# Patient Record
Sex: FEMALE | Race: ASIAN | NOT HISPANIC OR LATINO | URBAN - METROPOLITAN AREA
[De-identification: names, ages, dates, MRNs, and addresses within clinical notes are randomized per-mention and may not be internally consistent; named-entity substitution may affect disease eponyms.]

---

## 2017-05-05 PROBLEM — Z00.00 ENCOUNTER FOR PREVENTIVE HEALTH EXAMINATION: Status: ACTIVE | Noted: 2017-05-05

## 2017-05-06 ENCOUNTER — OUTPATIENT (OUTPATIENT)
Dept: OUTPATIENT SERVICES | Facility: HOSPITAL | Age: 27
LOS: 1 days | End: 2017-05-06
Payer: COMMERCIAL

## 2017-05-06 PROCEDURE — 70496 CT ANGIOGRAPHY HEAD: CPT | Mod: 26

## 2017-05-06 PROCEDURE — 70496 CT ANGIOGRAPHY HEAD: CPT

## 2017-05-09 ENCOUNTER — APPOINTMENT (OUTPATIENT)
Dept: NEUROSURGERY | Facility: CLINIC | Age: 27
End: 2017-05-09

## 2017-05-09 VITALS
HEIGHT: 61 IN | SYSTOLIC BLOOD PRESSURE: 108 MMHG | BODY MASS INDEX: 18.88 KG/M2 | HEART RATE: 69 BPM | DIASTOLIC BLOOD PRESSURE: 75 MMHG | WEIGHT: 100 LBS | OXYGEN SATURATION: 99 %

## 2017-05-09 DIAGNOSIS — Z78.9 OTHER SPECIFIED HEALTH STATUS: ICD-10-CM

## 2017-05-09 DIAGNOSIS — Z86.19 PERSONAL HISTORY OF OTHER INFECTIOUS AND PARASITIC DISEASES: ICD-10-CM

## 2017-05-09 DIAGNOSIS — Z82.49 FAMILY HISTORY OF ISCHEMIC HEART DISEASE AND OTHER DISEASES OF THE CIRCULATORY SYSTEM: ICD-10-CM

## 2017-05-09 DIAGNOSIS — Z86.69 PERSONAL HISTORY OF OTHER DISEASES OF THE NERVOUS SYSTEM AND SENSE ORGANS: ICD-10-CM

## 2017-05-09 DIAGNOSIS — Z87.09 PERSONAL HISTORY OF OTHER DISEASES OF THE RESPIRATORY SYSTEM: ICD-10-CM

## 2017-05-09 RX ORDER — TENOFOVIR DISOPROXIL FUMARATE 300 MG/1
300 TABLET, COATED ORAL
Refills: 0 | Status: ACTIVE | COMMUNITY

## 2020-10-21 ENCOUNTER — APPOINTMENT (OUTPATIENT)
Dept: MRI IMAGING | Facility: HOSPITAL | Age: 30
End: 2020-10-21

## 2020-10-21 ENCOUNTER — OUTPATIENT (OUTPATIENT)
Dept: OUTPATIENT SERVICES | Facility: HOSPITAL | Age: 30
LOS: 1 days | End: 2020-10-21
Payer: MEDICAID

## 2020-10-21 PROCEDURE — 70544 MR ANGIOGRAPHY HEAD W/O DYE: CPT | Mod: 26

## 2020-10-21 PROCEDURE — 70544 MR ANGIOGRAPHY HEAD W/O DYE: CPT

## 2020-10-27 ENCOUNTER — APPOINTMENT (OUTPATIENT)
Dept: NEUROSURGERY | Facility: CLINIC | Age: 30
End: 2020-10-27
Payer: MEDICAID

## 2020-11-03 ENCOUNTER — TRANSCRIPTION ENCOUNTER (OUTPATIENT)
Age: 30
End: 2020-11-03

## 2020-11-03 ENCOUNTER — APPOINTMENT (OUTPATIENT)
Dept: NEUROSURGERY | Facility: CLINIC | Age: 30
End: 2020-11-03
Payer: MEDICAID

## 2020-11-03 PROCEDURE — 99214 OFFICE O/P EST MOD 30 MIN: CPT | Mod: 95

## 2020-11-03 NOTE — DATA REVIEWED
[de-identified] : [facility]\par    [facility] Department of Radiology\par   Radiology Report\par \par \par Patient Name: NISHANT FREED   Report Date: 21-Oct-2020 18:43.00 \par Patient ID: [id number] ([id number]), [id number] ([id number])  Accession No.: [id number] \par Patient Birth Date: 1990  Report Status: F \par Referring Physician: [id number] SHOSHANA JEFFERSON   Reason For Study: infindibular dilataions at origins of right p-comm and right anterior choroidal  \par \par \par \par \par \par EXAM: MR ANGIO BRAIN\par \par PROCEDURE DATE: 10/21/2020\par \par \par \par INTERPRETATION: infindibular dilataions at origins of right p-comm and right anterior choroidal\par \par Technique: MRA of the intracranial circulation was performed using 3-D time of flight technique.\par \par Findings: Comparison to the prior MRI from an outside institution on 5/4/2017 cannot be performed at this time due to technical issues.\par \par There is an approximately 3 mm outpouching along the left supraclinoid internal carotid artery lateral to the takeoff of the left ophthalmic artery projecting superiorly consistent with a periophthalmic aneurysm . Evaluation of the anterior circulation demonstrates normal course and caliber of the right distal internal carotid artery. There is a normal appearance to the bilateral anterior cerebral and middle cerebral arteries.\par \par Evaluation the posterior circulation demonstrates normal course and caliber of the bilateral vertebral arteries, vertebrobasilar junction and basilar artery. The bilateral posterior cerebral arteries are also within normal limits.\par \par There is no evidence of stenosis .\par \par Impression: Left periophthalmic aneurysm\par \par \par \par \par Thank you for the opportunity to participate in the care of this patient.\par \par \par KLAUDIA ENGLAND M.D., ATTENDING RADIOLOGIST\par This document has been electronically signed. Oct 21 2020 6:43PM \par  Anesthesia confirms case reviewed for anesthesia risk alert.

## 2020-11-03 NOTE — PHYSICAL EXAM
[General Appearance - Alert] : alert [General Appearance - In No Acute Distress] : in no acute distress [Oriented To Time, Place, And Person] : oriented to person, place, and time [Impaired Insight] : insight and judgment were intact [Affect] : the affect was normal [Person] : oriented to person [Place] : oriented to place [Time] : oriented to time [Neck Appearance] : the appearance of the neck was normal [] : no respiratory distress [Respiration, Rhythm And Depth] : normal respiratory rhythm and effort

## 2020-11-03 NOTE — ASSESSMENT
[FreeTextEntry1] : Today we reviewed an MRA of the brain from October 21, 2020 that suggests enlargement of the aneurysm.  She isn’t a cigarette smoker and doesn’t have hypertension.\par \par We discussed what an aneurysm is and the risks of rupture.  The next step in the comprehensive neuro-imaging work up is to obtain a cerebral angiogram.  The procedure, benefits and risks were explained.  We will discuss the multiple management options after the angiogram.\par \par Plan:\par 1. Diagnostic cerebral angiogram on 11/16. Will need PCP clearance.

## 2020-11-03 NOTE — HISTORY OF PRESENT ILLNESS
[FreeTextEntry1] : \par 30 y/o RIGHT-HANDED female diagnosed with a small incidental intracranial aneurysm in 2017. MRA brain obtained as she has a FHX of rupture. She was complaining of lower extremity tingling sensation.\par \par MRA and CTA brain from May 2017 showed evidence of infindibular dilatations at the origins of the RIGHT posterior communicating artery and RIGHT anterior chroidal. There was also a small wide necked aneurysmal dilatation at the origin of the LEFT ophthalmic artery (2mm). \par \par Patient preferred to follow up with a repeat MRA but no follow up was made.\par \par Denies smoking and HTN hx.\par \par \par \par Handedness:\par RIGHT\par \par Address:\par [address]\[place] [address]\par \par Referring:\par Duane Coker MD\par [address] [facility] \[address]\[place] [address]\par T [phone]\par \par cc:\par Nancy Birmingham MD\par [address] [facility]\par [place], [place]\par T [phone]\par \par

## 2020-11-03 NOTE — ADDENDUM
[FreeTextEntry1] : November 3, 2020\par \par Duane Coker MD\par [address]\[place] [address]\par \par Patient’s Name:  Yuliet Reynaga\par : 1990\par \par Dear Dr. Coker:\par \par We saw Yuliet in tele health consultation at [facility].  As you know, she is a 29 years-old right handed woman who underwent neuro imaging work up as she has family history significant for subarachnoid hemorrhage from ruptured aneurysm. 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  Today we reviewed an MRA of the brain from 2020 that suggests enlargement of the aneurysm.  She isn’t a cigarette smoker and doesn’t have hypertension.\par \par We discussed what an aneurysm is and the risks of rupture.  The next step in the comprehensive neuro-imaging work up is to obtain a cerebral angiogram.  The procedure, benefits and risks were explained.  We will discuss the multiple management options after the angiogram.\par \par I will keep you updated at all times.  Thank you for allowing us to participate in Yuliet’s care.\par \par Sincerely,\par \par Jayden Palmer MD\par Chief\par Neuro-Endovascular Surgery and \par Interventional Neuroradiology \par [facility]\par [facility]\par [address]\par 3rd Floor\[place] [address]\par T:  [phone]\par F:  [phone]\par \par cc:	Nancy Birmingham MD\par 	[address]\par 	[place] \par \par 	Yuliet Reynaga\par 	[address]\par 	[address]\[place] 	[place] [address]\par \par \par

## 2020-11-04 DIAGNOSIS — Z01.818 ENCOUNTER FOR OTHER PREPROCEDURAL EXAMINATION: ICD-10-CM

## 2020-11-12 ENCOUNTER — NON-APPOINTMENT (OUTPATIENT)
Age: 30
End: 2020-11-12

## 2020-11-14 LAB — SARS-COV-2 N GENE NPH QL NAA+PROBE: NOT DETECTED

## 2020-11-15 RX ORDER — CHLORHEXIDINE GLUCONATE 213 G/1000ML
1 SOLUTION TOPICAL ONCE
Refills: 0 | Status: DISCONTINUED | OUTPATIENT
Start: 2020-11-16 | End: 2020-11-30

## 2020-11-16 ENCOUNTER — OUTPATIENT (OUTPATIENT)
Dept: OUTPATIENT SERVICES | Facility: HOSPITAL | Age: 30
LOS: 1 days | Discharge: HOME | End: 2020-11-16
Payer: MEDICAID

## 2020-11-16 LAB — HCG SERPL-ACNC: <0 MIU/ML — SIGNIFICANT CHANGE UP

## 2020-11-16 PROCEDURE — C1894: CPT

## 2020-11-16 PROCEDURE — C1725: CPT

## 2020-11-16 PROCEDURE — 36226 PLACE CATH VERTEBRAL ART: CPT | Mod: LT

## 2020-11-16 PROCEDURE — 84702 CHORIONIC GONADOTROPIN TEST: CPT

## 2020-11-16 PROCEDURE — 36227 PLACE CATH XTRNL CAROTID: CPT | Mod: 50,XS

## 2020-11-16 PROCEDURE — 36224 PLACE CATH CAROTD ART: CPT | Mod: 50

## 2020-11-16 PROCEDURE — 76377 3D RENDER W/INTRP POSTPROCES: CPT | Mod: 26

## 2020-11-16 PROCEDURE — 36415 COLL VENOUS BLD VENIPUNCTURE: CPT

## 2020-11-16 PROCEDURE — 36224 PLACE CATH CAROTD ART: CPT | Mod: 50,XS

## 2020-11-16 PROCEDURE — C1769: CPT

## 2020-11-16 PROCEDURE — 36227 PLACE CATH XTRNL CAROTID: CPT

## 2020-11-16 RX ORDER — SODIUM CHLORIDE 9 MG/ML
500 INJECTION INTRAMUSCULAR; INTRAVENOUS; SUBCUTANEOUS
Refills: 0 | Status: DISCONTINUED | OUTPATIENT
Start: 2020-11-16 | End: 2020-11-30

## 2020-11-16 RX ORDER — ACETAMINOPHEN 500 MG
650 TABLET ORAL ONCE
Refills: 0 | Status: DISCONTINUED | OUTPATIENT
Start: 2020-11-16 | End: 2020-11-30

## 2020-11-16 RX ADMIN — SODIUM CHLORIDE 100 MILLILITER(S): 9 INJECTION INTRAMUSCULAR; INTRAVENOUS; SUBCUTANEOUS at 10:56

## 2020-11-16 NOTE — PROGRESS NOTE ADULT - SUBJECTIVE AND OBJECTIVE BOX
Neuroendovascular Surgery Pre Proc Note      HPI: 29 years-old right handed woman with PMHx Hep B, who underwent neuro imaging work up as she has family history significant for subarachnoid hemorrhage from ruptured aneurysm. 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  MRA of the brain from October 21, 2020 review suggests enlargement of the aneurysm.  Patient now presents for diagnostic cerebral angiogram to further assess, she has no neurological defictis, denies recent illnesses, denies cough, sob, fever, myalgias, h/a's, or acute loss of taste or smell, covid test negative 11/13.        PMHx: as above, Chronic Hep B    PSHx: Neg    Soc Hx: Non smoker, no etoh, no illicit subst use    Fm Hx: SAH    Meds: Viread 300 mg oral daily    Allergies: NKDA, cats    Labs reviewed    Exam: NAD, WD/WN  HEENT: NC/AT, PERRLA, EOMI, anicteric, neck supple FROM, NT  Neuro: A&OX3, speech clear and fluent, CN 2-12 intact, motor 5/5 x4, SILT  CV: S1-S2, RRR  Lungs: CTA B/L  Abd: Soft, NT/ND, +BS  Ext: No C/C/E  LE Pulses: 2+ b/l DP  Skin: No rashes or lesions    Proc/R/B/A were explained all questions were answered and consent was signed.

## 2020-11-16 NOTE — BRIEF OPERATIVE NOTE - OPERATION/FINDINGS
Under MAC, using a 4 fr sheath right CFA, cerebral angiogram was performed.  Findings: Small left opthalmic aneurysm measuring approximately 2mm.  Mild right dural ring segment stenosis, non flow limiting  Full report to follow.  Patient tolerated procedure well, no new neurological deficit, hemodynamically stable.  Right groin/vasc access site: Direct manual compression applied, hemostasis achieved, no hematoma.  Patient was transferred to Cath lab recovery area and will go home later today.  Above d/w Spencer Arizmendi

## 2020-11-18 ENCOUNTER — NON-APPOINTMENT (OUTPATIENT)
Age: 30
End: 2020-11-18

## 2020-11-19 ENCOUNTER — NON-APPOINTMENT (OUTPATIENT)
Age: 30
End: 2020-11-19

## 2020-11-23 DIAGNOSIS — Z82.49 FAMILY HISTORY OF ISCHEMIC HEART DISEASE AND OTHER DISEASES OF THE CIRCULATORY SYSTEM: ICD-10-CM

## 2020-11-23 DIAGNOSIS — I67.1 CEREBRAL ANEURYSM, NONRUPTURED: ICD-10-CM

## 2020-11-23 DIAGNOSIS — I65.21 OCCLUSION AND STENOSIS OF RIGHT CAROTID ARTERY: ICD-10-CM

## 2020-11-23 DIAGNOSIS — Z83.3 FAMILY HISTORY OF DIABETES MELLITUS: ICD-10-CM

## 2020-11-23 DIAGNOSIS — Z82.3 FAMILY HISTORY OF STROKE: ICD-10-CM

## 2020-11-23 DIAGNOSIS — B18.1 CHRONIC VIRAL HEPATITIS B WITHOUT DELTA-AGENT: ICD-10-CM

## 2020-11-23 DIAGNOSIS — J45.909 UNSPECIFIED ASTHMA, UNCOMPLICATED: ICD-10-CM

## 2020-12-03 ENCOUNTER — NON-APPOINTMENT (OUTPATIENT)
Age: 30
End: 2020-12-03

## 2020-12-08 ENCOUNTER — APPOINTMENT (OUTPATIENT)
Dept: NEUROSURGERY | Facility: CLINIC | Age: 30
End: 2020-12-08
Payer: MEDICAID

## 2020-12-08 PROCEDURE — 99214 OFFICE O/P EST MOD 30 MIN: CPT | Mod: 95

## 2020-12-08 NOTE — REASON FOR VISIT
[Follow-Up: _____] : a [unfilled] follow-up visit [FreeTextEntry1] : Patient is s/p femoral cerebral angiogram on 11/16/2020 that demonstrated a 2 mm wide necked left para-ophthalmic aneurysm and 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\par \par The case was discussed on Cerebrovascular Conference with the recommendation to treat the aneurysm bc of patient's age and family history of SAH from ruptured aneurysm. Treatment options include Pipeline flow diverting stent or clipping via craniotomy. It was also recommended for the patient to undergo Rheumatology and Vascular Neurology w/u prior to treatment to rule out underlying vascular issue that may cause mild stenosis of the right ICA.\par \par She was referred to Dr. Josue Ramos (Rheum) and Dr. Nadya Sosa ([facility] Neurology) but she has a Neurologist from previous (Dr. Duane Coker) and was advised to follow up with him instead. She has a pending Rheumatology appointment on Thursday from outside [facility].\par \par She has no new neurological complains.

## 2020-12-08 NOTE — HISTORY OF PRESENT ILLNESS
[Home] : at home, [unfilled] , at the time of the visit. [Medical Office: ([facility])___] : at the medical office located in  [FreeTextEntry1] : \par 30 y/o RIGHT-HANDED female diagnosed with a small incidental intracranial aneurysm in 2017. MRA brain obtained as she has a FHX of rupture. She was complaining of lower extremity tingling sensation.\par \par MRA and CTA brain from May 2017 showed evidence of infindibular dilatations at the origins of the RIGHT posterior communicating artery and RIGHT anterior chroidal. There was also a small wide necked aneurysmal dilatation at the origin of the LEFT ophthalmic artery (2mm). Patient preferred to follow up with a repeat MRA but no follow up was made.\par \par A follow up MRA of the brain from October 21, 2020 suggested enlargement of the aneurysm. She underwent diagnostic cerebral angiogram on  11/16/2020 that demonstrated a 2 mm wide necked left para-ophthalmic aneurysm and 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\par \par Denies smoking and HTN hx.\par \par \par \par Handedness:\par RIGHT\par \par Address:\par [address]\[place] [address]\par \par Referring:\par Duane Coker MD\par [address] [facility] \[address]\[place] [address]\par T [phone]\par \par cc:\par Nancy Birmingham MD\par [address] [facility]\par [place], [place]\par T [phone]\par \par

## 2020-12-08 NOTE — ASSESSMENT
[FreeTextEntry1] : We obtained a cerebral angiogram on November 16, 2020 that demonstrated the aneurysm to measure 2 mm.  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  After discussion in cerebrovascular conference it was decided that because of her young age and family history it is appropriate to treat the left supra-ophthalmic aneurysm.  Today we discussed the options of endovascular flow diversion stenting and craniotomy with clip ligation.  She will follow up with Dr Shar Headley to discuss the clipping option more in detail.  It is also appropriate that she undergoes rheumatology work up because of the dysplastic appearance of bilateral ICAs.\par \par Plan:\par 1. See Rheumatology to rule out connective tissue disorder.\par 2. Follow up with Dr. Duane Coker for Neurology w/u.\par 3. Refer to Dr. Duane Hodges to discuss clipping of cerebral aneurysm--patient's insurance not accepted by Dr. Hodges. Gave the option of referring to Dr. Shar Headley. She would prefer Dr. Hodges and get out of network authorization. If auth is taking long, will refer to Dr. Headley.

## 2020-12-08 NOTE — ADDENDUM
[FreeTextEntry1] : 2020\par \par Duane Coker MD\par [address]\[place] [address]\par \par Patient’s Name:  Yuliet Reynaga\par : 1990\par \par Dear Dr. Coker:\par \par We saw Yuliet in tele health consultation at [facility].  As you know, she is a 29 years-old right handed woman who underwent neuro imaging work up as she has family history significant for subarachnoid hemorrhage from ruptured aneurysm. 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  We had reviewed an MRA of the brain from 2020 that suggested enlargement of the aneurysm.  She isn’t a cigarette smoker and doesn’t have hypertension.\par \par We obtained a cerebral angiogram on 2020 that demonstrated the aneurysm to measure 2 mm.  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  After discussion in cerebrovascular conference it was decided that because of her young age and family history it is appropriate to treat the left supra-ophthalmic aneurysm.  Today we discussed the options of endovascular flow diversion stenting and craniotomy with clip ligation.  She will follow up with Dr Shar Headley to discuss the clipping option more in detail.  It is also appropriate that she undergoes rheumatology work up because of the dysplastic appearance of bilateral ICAs.\par \par I will keep you updated at all times.  Thank you for allowing us to participate in Yuliet’s care.\par \par Sincerely,\par \par Jayden Palmer MD\par Chief\par Neuro-Endovascular Surgery and \par Interventional Neuroradiology \par [facility]\par [facility]\par [address]\par [address] Floor\[place] [address]\par T:  [phone]\par F:  [phone]\par \par cc:	Nancy Birmingham MD\par 	[address]\par 	[place], [place] \par \par 	Shar Headley MD\par \par 	Yuliet Man\par 	[address]\par 	[address]\[place] 	[place] [address]\par \par \par

## 2020-12-18 ENCOUNTER — APPOINTMENT (OUTPATIENT)
Dept: NEUROSURGERY | Facility: CLINIC | Age: 30
End: 2020-12-18
Payer: MEDICAID

## 2020-12-18 PROCEDURE — 99215 OFFICE O/P EST HI 40 MIN: CPT | Mod: 95

## 2020-12-18 NOTE — ASSESSMENT
[FreeTextEntry1] : Reviewed cerebral angiogram fro 11/16/2020 with patient which demonstrates 2 mm aneurysm of the left ICA as well as dysplasia and mild to moderate stenosis of the RIGHT ICA.\par She was presented at [facility] Cerebrovascular conference and given her family history of aneurysm rupture, it was recommended she consider treatment of aneurysm with flow diverting stent vs clipping.\par She is currently undergoing workup with rheumatology for underlying connective tissue disorder.\par Discussed craniotomy for clipping and risks, benefits and alternatives to surgery discussed in detail.\par Multiple questions answered to patient's satisfaction.\par \par Ms. Reynaga plans to follow up with Dr. Palmer after rheumatology workup complete and consultation with stroke neurology. She will return to see Dr. Headley if she would like to proceed with craniotomy for clip ligation of aneurysm.\par She has our contact information should she have any further questions/concerns.\par \par Patient verbalizes agreement and understanding with plan of care.\par \par

## 2020-12-18 NOTE — HISTORY OF PRESENT ILLNESS
[de-identified] : 30 year old woman with no pertinent medical history referred by Dr. Jayden Palmer  for discussion of craniotomy for clipping of cerebral aneurysm.\par \par Ms. Man was found to have an incidental intracranial aneurysm in . MRA brain was done due to family history of aneurysm rupture (her maternal grandmother  of ruptured cerebral aneurysm). She was complaining of lower extremity tingling sensation. 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 She preferred follow up with repeat MRA but no follow up was done. \par \par She had follow up MRA of the brain done on 10/21/2020 which suggested enlargement of the aneurysm. 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 \par \par The case was presented at [facility] Multidisciplinary Cerebrovascular Conference on with recommendations to treat the aneurysm based on age and and family history of SAH from ruptured aneurysm. Treatment options include flow diverting stent or clipping via craniotomy. It was recommended fort he patient to undergo rheumatology and vascular neurology workup prior to treatment to rule out underlying vascular disease that may cause mild stenosis of the RIGHT ICA. She has seen rheumatologist, Dr. Reji Luna, and he is suspicious that she has a fibromuscular dyplasia disorder. He is ordering a CT to evaluate for further evidence of fibromuscular dyplasia throughout the body. She has appointment with stroke neurology, Dr. Sosa next week.\par \par \par She has no history of smoking and no history of HTN.

## 2020-12-23 ENCOUNTER — APPOINTMENT (OUTPATIENT)
Dept: NEUROLOGY | Facility: CLINIC | Age: 30
End: 2020-12-23

## 2021-01-15 ENCOUNTER — APPOINTMENT (OUTPATIENT)
Dept: NEUROLOGY | Facility: CLINIC | Age: 31
End: 2021-01-15
Payer: MEDICAID

## 2021-01-15 VITALS
HEART RATE: 85 BPM | HEIGHT: 61 IN | TEMPERATURE: 97.7 F | WEIGHT: 107 LBS | DIASTOLIC BLOOD PRESSURE: 75 MMHG | SYSTOLIC BLOOD PRESSURE: 108 MMHG | OXYGEN SATURATION: 97 % | BODY MASS INDEX: 20.2 KG/M2

## 2021-01-15 PROCEDURE — ZZZZZ: CPT

## 2021-01-15 NOTE — HISTORY OF PRESENT ILLNESS
[FreeTextEntry1] : work up for FMD. \par hyperelastic joints in fingers, wrists, elbows and shoulders. \par no lens dislocation\par no fracture of joints. \par hip pain with to much walking\par \par Not very active. \par no gym \par just walks \par \par \par \par   MR Angio Head No Cont             Final\par \par No Documents Attached\par \par \par \par  EXAM:  MR ANGIO BRAIN\par \par PROCEDURE DATE:  10/21/2020\par \par \par \par INTERPRETATION:  infindibular dilataions at origins of right p-comm and right anterior choroidal\par \par Technique: MRA of the intracranial circulation was performed using 3-D time of flight technique.\par \par Findings: Comparison to the prior MRI from an outside institution on 5/4/2017 cannot be performed at this time due to technical issues.\par \par There is an approximately 3 mm outpouching along the left supraclinoid internal carotid artery lateral to the takeoff of the left ophthalmic artery projecting superiorly consistent with a periophthalmic aneurysm . Evaluation of the anterior circulation demonstrates normal course and caliber of the right distal internal carotid artery. There is a normal appearance to the bilateral anterior cerebral and middle cerebral arteries.\par \par Evaluation the posterior circulation demonstrates normal course and caliber of the bilateral vertebral arteries, vertebrobasilar junction and basilar artery. The bilateral posterior cerebral arteries are also within normal limits.\par \par There is no evidence of  stenosis  .\par \par Impression: Left periophthalmic aneurysm\par \par

## 2021-02-17 ENCOUNTER — NON-APPOINTMENT (OUTPATIENT)
Age: 31
End: 2021-02-17

## 2021-02-22 ENCOUNTER — TRANSCRIPTION ENCOUNTER (OUTPATIENT)
Age: 31
End: 2021-02-22

## 2021-04-26 ENCOUNTER — NON-APPOINTMENT (OUTPATIENT)
Age: 31
End: 2021-04-26

## 2021-07-15 ENCOUNTER — APPOINTMENT (OUTPATIENT)
Dept: NEUROLOGY | Facility: CLINIC | Age: 31
End: 2021-07-15
Payer: MEDICAID

## 2021-07-15 VITALS
HEIGHT: 61 IN | SYSTOLIC BLOOD PRESSURE: 106 MMHG | WEIGHT: 107 LBS | OXYGEN SATURATION: 96 % | TEMPERATURE: 98.4 F | DIASTOLIC BLOOD PRESSURE: 71 MMHG | HEART RATE: 82 BPM | BODY MASS INDEX: 20.2 KG/M2

## 2021-07-15 PROCEDURE — 99214 OFFICE O/P EST MOD 30 MIN: CPT

## 2021-07-25 NOTE — HISTORY OF PRESENT ILLNESS
[FreeTextEntry1] : Pt is 30yoF with PMH  Left periophthalmic aneurysm and R ICA stenosis here for routine f/u.\par In the process of w/u by neurosx to determine treatment plan: stent vs clipping\par \par /71 today \par  \par Had one episode blurred vision L eye between 3-5 months ago (unclear when) that lasted 45 minutes and resolved on its own\par \par Otherwise she denies any new or worsening specific neurological complaints including headache/ dizziness/ speech impairment/ focal or generalized weakness/ sensation changes/ gait disturbance/ tremor. \par \par MRA abdomen was not approved by insurance so unable to get that done

## 2021-07-25 NOTE — ASSESSMENT
[FreeTextEntry1] : Pt with PMH  Left periophthalmic aneurysm and R ICA stenosis, in process of being scheduled for stenting by neurosurgery, still need MRA abdomen to eval for FMD given her young age with hx of aneurysm and ICA stenosis \par \par plan:\par MRA abdomen\par \par R ICA stenosis\par -f/u with neurosx for stenting\par -cont ASA, monitor for ADEs \par \par Aneurysm\par -will check aspirin response given new episode of blurred vision \par -monitor for any further neurological symptoms\par \par

## 2021-08-18 ENCOUNTER — NON-APPOINTMENT (OUTPATIENT)
Age: 31
End: 2021-08-18

## 2021-08-23 ENCOUNTER — NON-APPOINTMENT (OUTPATIENT)
Age: 31
End: 2021-08-23

## 2021-09-02 ENCOUNTER — RESULT REVIEW (OUTPATIENT)
Age: 31
End: 2021-09-02

## 2021-09-02 ENCOUNTER — OUTPATIENT (OUTPATIENT)
Dept: OUTPATIENT SERVICES | Facility: HOSPITAL | Age: 31
LOS: 1 days | End: 2021-09-02

## 2021-09-02 ENCOUNTER — APPOINTMENT (OUTPATIENT)
Dept: MRI IMAGING | Facility: CLINIC | Age: 31
End: 2021-09-02
Payer: MEDICAID

## 2021-09-02 PROCEDURE — 74185 MRA ABD W OR W/O CNTRST: CPT | Mod: 26

## 2021-10-10 ENCOUNTER — NON-APPOINTMENT (OUTPATIENT)
Age: 31
End: 2021-10-10

## 2021-10-15 ENCOUNTER — APPOINTMENT (OUTPATIENT)
Dept: HEART AND VASCULAR | Facility: CLINIC | Age: 31
End: 2021-10-15
Payer: MEDICAID

## 2021-10-15 PROCEDURE — 99423 OL DIG E/M SVC 21+ MIN: CPT

## 2021-10-25 ENCOUNTER — TRANSCRIPTION ENCOUNTER (OUTPATIENT)
Age: 31
End: 2021-10-25

## 2021-11-09 NOTE — REASON FOR VISIT
[Home] : at home, [unfilled] , at the time of the visit. [Medical Office: ([facility])___] : at the medical office located in  [Verbal consent obtained from patient] : the patient, [unfilled] [FreeTextEntry3] : Nadya Alas [FreeTextEntry1] : Pt is 29 yo F hep B-treated, PMH Left periophthalmic aneurysm and prior R ICA stenosis awaiting stent vs clipping of aneurysm here for cardiovascular evaluation\par \par In 2017, she was getting headaches and the doctor heard that her grandfather had an aneurysm and he and she had an MRA and connected to Dr. Palmer. \par \par She reports that she has not had chest pain, dyspnea.No significant episodes of lightheadedness. \par No focal weakness other than a little brain fog. She reports on certain days she has what she deems to be brain fog which has been going on for years.  \par \par -She does not exercise, but walks for her usual activities. She has not been very active since high school. \par -She eats meat and a lot of veggies. She likes sugary beverages.\par -No prior pregnancies, relatively regular period. \par \par MGM- aneurysm, M- A&W- hep B, father- DM, no siblings\par \par She is currently working as a pharmacist. \par \par SH- no, no alcohol, no drugs \par PSH- none\par \par chol- 112, LDL 40, trig 41, HDL- 60

## 2021-11-09 NOTE — DISCUSSION/SUMMARY
[FreeTextEntry1] : Pt is 31 yo F hep B-treated, PMH Left periophthalmic aneurysm and R ICA stenosis awaiting stent vs clipping here for cardiovascular evaluation.\par \par Aneurysm//Stenosis- planned for intra-arterial stent placement for aneurysm. \par Based on very low LDL and no FH of CVD, more likely to be related to FMD. \par Can consider imaging of femorals for typical appearance if useful and use of CTA for more sensitive assessment, but can be done post procedure. \par \par Would benefit from an EKG prior to surgery/procedure, but she is Otherwise at very low risk from a cardiac standpoint and can proceed at this time.\par \par Will have her follow-up post procedure for further management. No evidence of need for any medical therapy at this time other than aspirin.

## 2021-11-23 ENCOUNTER — APPOINTMENT (OUTPATIENT)
Dept: NEUROSURGERY | Facility: CLINIC | Age: 31
End: 2021-11-23
Payer: MEDICAID

## 2021-11-23 PROCEDURE — 99213 OFFICE O/P EST LOW 20 MIN: CPT | Mod: 95

## 2021-11-23 NOTE — HISTORY OF PRESENT ILLNESS
[Home] : at home, [unfilled] , at the time of the visit. [Medical Office: ([facility])___] : at the medical office located in  [Verbal consent obtained from patient] : the patient, [unfilled] [FreeTextEntry1] : \par 29 y/o RIGHT-HANDED female diagnosed with a small incidental intracranial aneurysm in 2017. MRA brain obtained as she has a FHX of rupture. She was complaining of lower extremity tingling sensation. Denies smoking and HTN hx.\par \par MRA and CTA brain from May 2017 showed evidence of infindibular dilatations at the origins of the RIGHT posterior communicating artery and RIGHT anterior choroidal. There was also a small wide necked aneurysmal dilatation at the origin of the LEFT ophthalmic artery (2mm). Patient preferred to follow up with a repeat MRA but no follow up was made.\par \par A follow up MRA of the brain from October 21, 2020 suggested enlargement of the aneurysm. She underwent diagnostic cerebral angiogram on 11/16/2020 that demonstrated a 2 mm wide necked left para-ophthalmic aneurysm and 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 Of note, she was also found to have dysplasia and mild to moderate stenosis of the right ICA at the level of the dural ring.\par \par  After discussion in cerebrovascular conference it was decided that because of her young age and family history it is appropriate to treat the left supra-ophthalmic aneurysm. Treatment options including endovascular flow diversion stenting and craniotomy with clip ligation were discussed. She had a discussion with Dr Shar Headley re: clipping option.  It was also suggested that she undergoes rheumatology work up because of the dysplastic appearance of bilateral ICAs and stroke neurology consultation with Dr. Nadya Sosa to eval for FMD to make sure it is safe to undergo endovascular embolization treatment of the aneurysm with a stent. MRI results from Neuro w/u negative for FMD. She was referred to Cardio, Dr. Shona Kinney, for cardiac eval. \par \par She is currently on ASA 81 mg.\par \par \par \par \par \par Handedness:\par RIGHT\par \par Address:\par [address]\par [facility] [place] [address]\par \par Referring:\par Duane Coker MD\par [address] \[address]\[place] [address]\par T [phone]\par \par cc:\par Nancy Birmingham MD\par [address]\par [place], [place]\par T [phone]\par \par \par

## 2021-11-23 NOTE — ADDENDUM
[FreeTextEntry1] : Dear Dr. Coker:\par \par We saw Yuliet in tele health consultation at [facility].  As you know, she is a 30 years-old right-handed woman who underwent neuro imaging work up as she has family history significant for subarachnoid hemorrhage from ruptured aneurysm. 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  We had reviewed an MRA of the brain from October 21, 2020 that suggested enlargement of the aneurysm.  She isn’t a cigarette smoker and doesn’t have hypertension.\par \par We obtained a cerebral angiogram on November 16, 2020 that demonstrated the aneurysm to measure 2 mm.  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  After discussion in cerebrovascular conference, it was decided that because of her young age and family history it is appropriate to treat the left supra-ophthalmic aneurysm.  Today we discussed the options of endovascular flow diversion stenting and craniotomy with clip ligation.  She prefers to undergo clip ligation.\par \par I will keep you updated at all times.  Thank you for allowing us to participate in Yuliet’s care.\par \par \par Sincerely,\par \par \par Jayden Palmer MD\par Chief\par Neuro-Endovascular Surgery and \par Interventional Neuroradiology \par [facility]\par \par [facility]\par [address]\par [address] Floor\[place] [address]\par T:  [phone]\par F:  [phone]\par \par cc:	Nancy Birmingham MD\par 	[address]\par 	[place], [place] \par \par 	Shar Headley MD\par \par 	Yuliet Man\par 	[address]\par 	[address]\[place] 	[place] [address]\par \par \par \par \par \par

## 2021-11-23 NOTE — REASON FOR VISIT
[Follow-Up: _____] : a [unfilled] follow-up visit [FreeTextEntry1] : Patient doing well w/o new neuro complaints.

## 2021-11-23 NOTE — ASSESSMENT
[FreeTextEntry1] :  30 years-old right-handed woman who underwent neuro imaging work up as she has family history significant for subarachnoid hemorrhage from ruptured aneurysm. 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  We had reviewed an MRA of the brain from October 21, 2020 that suggested enlargement of the aneurysm.  She isn’t a cigarette smoker and doesn’t have hypertension.\par \par We obtained a cerebral angiogram on November 16, 2020 that demonstrated the aneurysm to measure 2 mm.  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  After discussion in cerebrovascular conference, it was decided that because of her young age and family history it is appropriate to treat the left supra-ophthalmic aneurysm.  Today we discussed the options of endovascular flow diversion stenting and craniotomy with clip ligation.  She prefers to undergo clip ligation.\par \par \par \par PLan:\par 1. Follow up with Dr. Headley for aneurysm clipping.

## 2021-12-03 ENCOUNTER — APPOINTMENT (OUTPATIENT)
Dept: NEUROSURGERY | Facility: CLINIC | Age: 31
End: 2021-12-03
Payer: MEDICAID

## 2021-12-03 PROCEDURE — 99215 OFFICE O/P EST HI 40 MIN: CPT | Mod: 95

## 2021-12-03 NOTE — HISTORY OF PRESENT ILLNESS
[de-identified] : 30 year old woman with no pertinent medical history referred by Dr. Jayden Palmer  for discussion of craniotomy for clipping of cerebral aneurysm.\par \par Ms. Reynaga was found to have an incidental intracranial aneurysm in . MRA brain was done due to family history of aneurysm rupture (her maternal grandmother  of ruptured cerebral aneurysm). She was complaining of lower extremity tingling sensation. 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 She preferred follow up with repeat MRA but no follow up was done. \par \par She had follow up MRA of the brain done on 10/21/2020 which suggested enlargement of the aneurysm. 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 \par \par The case was presented at [facility] Multidisciplinary Cerebrovascular Conference on with recommendations to treat the aneurysm based on age and and family history of SAH from ruptured aneurysm. Treatment options include flow diverting stent or clipping via craniotomy. It was recommended fort he patient to undergo rheumatology and vascular neurology workup prior to treatment to rule out underlying vascular disease that may cause mild stenosis of the RIGHT ICA. She has seen rheumatologist, Dr. Reji Luna, and he is suspicious that she has a fibromuscular dyplasia disorder. She is seeing Dr. Sosa for further evaluation of \par \par After discussion at cerebrovascular conference, it was decided that because of her young age and family history, it is appropriate to treat the left supra-ophthalmic aneurysm with flow diversion stenting vs clip ligation. \par \par She returns today to discuss craniotomy for clipping of aneurysm. \par \par She has no history of smoking and no history of HTN.

## 2021-12-03 NOTE — ASSESSMENT
[FreeTextEntry1] : Cerebral angiogram reviewed by Dr. Headley with patient, demonstrates a 2 mm wide necked left paraophthalmic aneurysm.\par Reviewed treatment options - flow diverting stent vs craniotomy for clipping of aneurysm vs continued imaging surveillance\par Discussed CVC recommendations for treatment of aneurysm.\par She has met with Dr. Palmer to discuss flow diverting stent and favors craniotomy for clipping of aneurysm.\par Discussed risks, benefits of alternatives to craniotomy in detail with patient.\par Multiple questions answered to patient's satisfaction.\par She verbalizes understanding and wishes to proceed with planned surgery - we will schedule her for 1/5/22.\par She will need medical clearance and COVID swab ~72 hours of surgery.\par \par Patient verbalizes agreement and understanding with plan of care.\par \par I, Dr. Headley, personally performed the evaluation and management (E/M) services for this established patient who presents today with (a) new problem(s)/exacerbation of (an) existing condition(s).  That E/M includes conducting the examination, assessing all new/exacerbated conditions, and establishing a new plan of care.  Today, my ACP, Sydni Quezada, was here to observe my evaluation and management services for this new problem/exacerbated condition to be followed going forward.\par \par

## 2021-12-06 ENCOUNTER — NON-APPOINTMENT (OUTPATIENT)
Age: 31
End: 2021-12-06

## 2021-12-19 ENCOUNTER — RESULT REVIEW (OUTPATIENT)
Age: 31
End: 2021-12-19

## 2021-12-19 ENCOUNTER — APPOINTMENT (OUTPATIENT)
Dept: MRI IMAGING | Facility: CLINIC | Age: 31
End: 2021-12-19
Payer: MEDICAID

## 2021-12-19 ENCOUNTER — OUTPATIENT (OUTPATIENT)
Dept: OUTPATIENT SERVICES | Facility: HOSPITAL | Age: 31
LOS: 1 days | End: 2021-12-19

## 2021-12-19 PROCEDURE — 70544 MR ANGIOGRAPHY HEAD W/O DYE: CPT | Mod: 26

## 2022-01-04 ENCOUNTER — TRANSCRIPTION ENCOUNTER (OUTPATIENT)
Age: 32
End: 2022-01-04

## 2022-01-04 VITALS
HEIGHT: 61 IN | OXYGEN SATURATION: 99 % | HEART RATE: 78 BPM | DIASTOLIC BLOOD PRESSURE: 64 MMHG | WEIGHT: 108.03 LBS | TEMPERATURE: 98 F | RESPIRATION RATE: 16 BRPM | SYSTOLIC BLOOD PRESSURE: 96 MMHG

## 2022-01-04 LAB — SARS-COV-2 N GENE NPH QL NAA+PROBE: NOT DETECTED

## 2022-01-04 RX ORDER — POVIDONE-IODINE 5 %
1 AEROSOL (ML) TOPICAL ONCE
Refills: 0 | Status: COMPLETED | OUTPATIENT
Start: 2022-01-05 | End: 2022-01-05

## 2022-01-04 RX ORDER — INFLUENZA VIRUS VACCINE 15; 15; 15; 15 UG/.5ML; UG/.5ML; UG/.5ML; UG/.5ML
0.5 SUSPENSION INTRAMUSCULAR ONCE
Refills: 0 | Status: DISCONTINUED | OUTPATIENT
Start: 2022-01-05 | End: 2022-01-07

## 2022-01-04 NOTE — PRE-OP CHECKLIST - SELECT TESTS ORDERED
COVID-19 BMP/CBC/CMP/PT/PTT/INR/EKG/CXR/COVID-19 Preg Test -DOS-/BMP/CBC/CMP/PT/PTT/INR/Type and Screen/EKG/CXR/COVID-19 Preg Test -DOS-NEGATIVE/BMP/CBC/CMP/PT/PTT/INR/Type and Screen/EKG/CXR/COVID-19

## 2022-01-04 NOTE — PATIENT PROFILE ADULT - FALL HARM RISK - UNIVERSAL INTERVENTIONS
Bed in lowest position, wheels locked, appropriate side rails in place/Call bell, personal items and telephone in reach/Instruct patient to call for assistance before getting out of bed or chair/Non-slip footwear when patient is out of bed/[place] to call system/Physically safe environment - no spills, clutter or unnecessary equipment/Purposeful Proactive Rounding/Room/bathroom lighting operational, light cord in reach

## 2022-01-05 ENCOUNTER — INPATIENT (INPATIENT)
Facility: HOSPITAL | Age: 32
LOS: 1 days | Discharge: HOME | DRG: 26 | End: 2022-01-07
Attending: NEUROLOGICAL SURGERY | Admitting: NEUROLOGICAL SURGERY
Payer: MEDICAID

## 2022-01-05 ENCOUNTER — APPOINTMENT (OUTPATIENT)
Dept: NEUROSURGERY | Facility: HOSPITAL | Age: 32
End: 2022-01-05

## 2022-01-05 DIAGNOSIS — Z98.890 OTHER SPECIFIED POSTPROCEDURAL STATES: Chronic | ICD-10-CM

## 2022-01-05 DIAGNOSIS — I67.1 CEREBRAL ANEURYSM, NONRUPTURED: ICD-10-CM

## 2022-01-05 LAB
BLD GP AB SCN SERPL QL: NEGATIVE — SIGNIFICANT CHANGE UP
RH IG SCN BLD-IMP: POSITIVE — SIGNIFICANT CHANGE UP

## 2022-01-05 PROCEDURE — 61703 CLAMP NECK ARTERY: CPT | Mod: 82

## 2022-01-05 PROCEDURE — 61703 CLAMP NECK ARTERY: CPT

## 2022-01-05 PROCEDURE — 61697 BRAIN ANEURYSM REPR COMPLX: CPT | Mod: 22

## 2022-01-05 PROCEDURE — 92240 ICG ANGIOGRAPHY I&R UNI/BI: CPT | Mod: 26,82

## 2022-01-05 PROCEDURE — 67570 DECOMPRESS OPTIC NERVE: CPT | Mod: 80,LT

## 2022-01-05 PROCEDURE — 92240 ICG ANGIOGRAPHY I&R UNI/BI: CPT | Mod: 26

## 2022-01-05 PROCEDURE — 61697 BRAIN ANEURYSM REPR COMPLX: CPT | Mod: 82,22

## 2022-01-05 PROCEDURE — 93888 INTRACRANIAL LIMITED STUDY: CPT | Mod: 26,82

## 2022-01-05 PROCEDURE — 93888 INTRACRANIAL LIMITED STUDY: CPT | Mod: 26

## 2022-01-05 PROCEDURE — 36224 PLACE CATH CAROTD ART: CPT | Mod: LT

## 2022-01-05 PROCEDURE — 62141 CRNOP SKULL DEFECT>5 CM DIAM: CPT

## 2022-01-05 PROCEDURE — 99024 POSTOP FOLLOW-UP VISIT: CPT

## 2022-01-05 PROCEDURE — 99291 CRITICAL CARE FIRST HOUR: CPT

## 2022-01-05 PROCEDURE — 67570 DECOMPRESS OPTIC NERVE: CPT | Mod: LT

## 2022-01-05 DEVICE — SURGICEL FIBRILLAR 4 X 4": Type: IMPLANTABLE DEVICE | Status: FUNCTIONAL

## 2022-01-05 DEVICE — PLATE UN3 2 HOLE: Type: IMPLANTABLE DEVICE | Status: FUNCTIONAL

## 2022-01-05 DEVICE — SURGIFOAM PAD 8CM X 12.5CM X 10MM (100): Type: IMPLANTABLE DEVICE | Status: FUNCTIONAL

## 2022-01-05 DEVICE — CLIP APPLIER ETHICON LIGACLIP 11.5" MEDIUM: Type: IMPLANTABLE DEVICE | Status: FUNCTIONAL

## 2022-01-05 DEVICE — CLIP APPLIER COVIDIEN SURGICLIP III 9" SM: Type: IMPLANTABLE DEVICE | Status: FUNCTIONAL

## 2022-01-05 DEVICE — COLLAGEN DURAMATRIX ONLAY 4X5IN: Type: IMPLANTABLE DEVICE | Status: FUNCTIONAL

## 2022-01-05 DEVICE — SURGIFLO HEMOSTATIC MATRIX KIT: Type: IMPLANTABLE DEVICE | Status: FUNCTIONAL

## 2022-01-05 DEVICE — PLATE COVER BURRHOLE UN3 W/TAB 14MM: Type: IMPLANTABLE DEVICE | Status: FUNCTIONAL

## 2022-01-05 DEVICE — IMPLANTABLE DEVICE: Type: IMPLANTABLE DEVICE | Status: FUNCTIONAL

## 2022-01-05 DEVICE — PLATE UN3 GAP 6 HOLE SM: Type: IMPLANTABLE DEVICE | Status: FUNCTIONAL

## 2022-01-05 DEVICE — SURGICEL 4 X 8": Type: IMPLANTABLE DEVICE | Status: FUNCTIONAL

## 2022-01-05 DEVICE — FILLER BONE VOID HYDROSET 15CC: Type: IMPLANTABLE DEVICE | Status: FUNCTIONAL

## 2022-01-05 DEVICE — GELFOAM 12 X 7MM: Type: IMPLANTABLE DEVICE | Status: FUNCTIONAL

## 2022-01-05 DEVICE — PIN SKULL STRL ADLT DORO LUCENT DISP PK/3: Type: IMPLANTABLE DEVICE | Status: FUNCTIONAL

## 2022-01-05 DEVICE — SCREW SLF-DRILL 1.5X4MM: Type: IMPLANTABLE DEVICE | Status: FUNCTIONAL

## 2022-01-05 DEVICE — PLATE COVER BURRHOLE UN3 W/TAB 10MM: Type: IMPLANTABLE DEVICE | Status: FUNCTIONAL

## 2022-01-05 RX ORDER — INSULIN LISPRO 100/ML
VIAL (ML) SUBCUTANEOUS
Refills: 0 | Status: DISCONTINUED | OUTPATIENT
Start: 2022-01-05 | End: 2022-01-06

## 2022-01-05 RX ORDER — DEXTROSE 50 % IN WATER 50 %
25 SYRINGE (ML) INTRAVENOUS ONCE
Refills: 0 | Status: DISCONTINUED | OUTPATIENT
Start: 2022-01-05 | End: 2022-01-06

## 2022-01-05 RX ORDER — GLUCAGON INJECTION, SOLUTION 0.5 MG/.1ML
1 INJECTION, SOLUTION SUBCUTANEOUS ONCE
Refills: 0 | Status: DISCONTINUED | OUTPATIENT
Start: 2022-01-05 | End: 2022-01-07

## 2022-01-05 RX ORDER — ONDANSETRON 8 MG/1
4 TABLET, FILM COATED ORAL EVERY 6 HOURS
Refills: 0 | Status: DISCONTINUED | OUTPATIENT
Start: 2022-01-05 | End: 2022-01-07

## 2022-01-05 RX ORDER — DEXAMETHASONE 0.5 MG/5ML
4 ELIXIR ORAL EVERY 6 HOURS
Refills: 0 | Status: COMPLETED | OUTPATIENT
Start: 2022-01-05 | End: 2022-01-06

## 2022-01-05 RX ORDER — CEFAZOLIN SODIUM 1 G
2000 VIAL (EA) INJECTION EVERY 8 HOURS
Refills: 0 | Status: COMPLETED | OUTPATIENT
Start: 2022-01-05 | End: 2022-01-06

## 2022-01-05 RX ORDER — DEXTROSE 50 % IN WATER 50 %
12.5 SYRINGE (ML) INTRAVENOUS ONCE
Refills: 0 | Status: DISCONTINUED | OUTPATIENT
Start: 2022-01-05 | End: 2022-01-06

## 2022-01-05 RX ORDER — SODIUM CHLORIDE 9 MG/ML
1000 INJECTION, SOLUTION INTRAVENOUS
Refills: 0 | Status: DISCONTINUED | OUTPATIENT
Start: 2022-01-05 | End: 2022-01-06

## 2022-01-05 RX ORDER — DEXAMETHASONE 0.5 MG/5ML
2 ELIXIR ORAL EVERY 6 HOURS
Refills: 0 | Status: COMPLETED | OUTPATIENT
Start: 2022-01-06 | End: 2022-01-07

## 2022-01-05 RX ORDER — DEXAMETHASONE 0.5 MG/5ML
ELIXIR ORAL
Refills: 0 | Status: DISCONTINUED | OUTPATIENT
Start: 2022-01-07 | End: 2022-01-07

## 2022-01-05 RX ORDER — DEXTROSE 50 % IN WATER 50 %
15 SYRINGE (ML) INTRAVENOUS ONCE
Refills: 0 | Status: DISCONTINUED | OUTPATIENT
Start: 2022-01-05 | End: 2022-01-06

## 2022-01-05 RX ORDER — CHLORHEXIDINE GLUCONATE 213 G/1000ML
1 SOLUTION TOPICAL
Refills: 0 | Status: DISCONTINUED | OUTPATIENT
Start: 2022-01-05 | End: 2022-01-07

## 2022-01-05 RX ORDER — SENNA PLUS 8.6 MG/1
2 TABLET ORAL AT BEDTIME
Refills: 0 | Status: DISCONTINUED | OUTPATIENT
Start: 2022-01-05 | End: 2022-01-07

## 2022-01-05 RX ORDER — ACETAMINOPHEN 500 MG
650 TABLET ORAL EVERY 6 HOURS
Refills: 0 | Status: DISCONTINUED | OUTPATIENT
Start: 2022-01-05 | End: 2022-01-07

## 2022-01-05 RX ORDER — PANTOPRAZOLE SODIUM 20 MG/1
40 TABLET, DELAYED RELEASE ORAL DAILY
Refills: 0 | Status: DISCONTINUED | OUTPATIENT
Start: 2022-01-05 | End: 2022-01-06

## 2022-01-05 RX ORDER — CHLORHEXIDINE GLUCONATE 213 G/1000ML
1 SOLUTION TOPICAL ONCE
Refills: 0 | Status: COMPLETED | OUTPATIENT
Start: 2022-01-05 | End: 2022-01-05

## 2022-01-05 RX ORDER — HYDROMORPHONE HYDROCHLORIDE 2 MG/ML
0.25 INJECTION INTRAMUSCULAR; INTRAVENOUS; SUBCUTANEOUS ONCE
Refills: 0 | Status: DISCONTINUED | OUTPATIENT
Start: 2022-01-05 | End: 2022-01-05

## 2022-01-05 RX ORDER — SODIUM CHLORIDE 9 MG/ML
1000 INJECTION INTRAMUSCULAR; INTRAVENOUS; SUBCUTANEOUS
Refills: 0 | Status: DISCONTINUED | OUTPATIENT
Start: 2022-01-05 | End: 2022-01-05

## 2022-01-05 RX ORDER — SODIUM CHLORIDE 9 MG/ML
1000 INJECTION INTRAMUSCULAR; INTRAVENOUS; SUBCUTANEOUS
Refills: 0 | Status: DISCONTINUED | OUTPATIENT
Start: 2022-01-05 | End: 2022-01-06

## 2022-01-05 RX ORDER — LEVETIRACETAM 250 MG/1
500 TABLET, FILM COATED ORAL EVERY 12 HOURS
Refills: 0 | Status: DISCONTINUED | OUTPATIENT
Start: 2022-01-05 | End: 2022-01-07

## 2022-01-05 RX ORDER — DEXAMETHASONE 0.5 MG/5ML
1 ELIXIR ORAL EVERY 6 HOURS
Refills: 0 | Status: DISCONTINUED | OUTPATIENT
Start: 2022-01-07 | End: 2022-01-07

## 2022-01-05 RX ADMIN — Medication 2000 MILLIGRAM(S): at 21:17

## 2022-01-05 RX ADMIN — HYDROMORPHONE HYDROCHLORIDE 0.25 MILLIGRAM(S): 2 INJECTION INTRAMUSCULAR; INTRAVENOUS; SUBCUTANEOUS at 16:42

## 2022-01-05 RX ADMIN — CHLORHEXIDINE GLUCONATE 1 APPLICATION(S): 213 SOLUTION TOPICAL at 06:39

## 2022-01-05 RX ADMIN — Medication 4 MILLIGRAM(S): at 18:20

## 2022-01-05 RX ADMIN — Medication 650 MILLIGRAM(S): at 22:14

## 2022-01-05 RX ADMIN — Medication 4 MILLIGRAM(S): at 23:15

## 2022-01-05 RX ADMIN — SENNA PLUS 2 TABLET(S): 8.6 TABLET ORAL at 21:16

## 2022-01-05 RX ADMIN — LEVETIRACETAM 500 MILLIGRAM(S): 250 TABLET, FILM COATED ORAL at 21:16

## 2022-01-05 RX ADMIN — Medication 1 APPLICATION(S): at 06:39

## 2022-01-05 RX ADMIN — HYDROMORPHONE HYDROCHLORIDE 0.25 MILLIGRAM(S): 2 INJECTION INTRAMUSCULAR; INTRAVENOUS; SUBCUTANEOUS at 17:00

## 2022-01-05 RX ADMIN — SODIUM CHLORIDE 50 MILLILITER(S): 9 INJECTION INTRAMUSCULAR; INTRAVENOUS; SUBCUTANEOUS at 16:42

## 2022-01-05 NOTE — H&P ADULT - NSHPPHYSICALEXAM_GEN_ALL_CORE
Oriented X 3  awake and alert  motor exam 5/5 in all extremities  no sensory deficit  CN intact  gait and cerebellar functions intact

## 2022-01-05 NOTE — PROGRESS NOTE ADULT - SUBJECTIVE AND OBJECTIVE BOX
=================================  NEUROCRITICAL CARE ATTENDING NOTE  =================================    LOURDES DILLARD   MRN-[id number]  Summary:  31y/F with hep B, with family history of aneurysm rupture (grandmother).  She presented with tingling lower extremity, MRA / CTA showed R PComm / R Achoroidal dilatations, Lopthhalmic artery dilatation.  Follow-up MRA c/w enlargement of aneurysm.  Angio 11/2020 showed wide-neck paraophtalmic aneurysm, mild stenosis R ICA dural ring.  Now admitted for elective crani/clipping.  (05 Jan 2022 07:56)    COURSE IN THE HOSPITAL:  01/05 admitted to [facility], s/p OR    Past Medical History: Hepatitis B  Allergies:  Bactrim (Rash) Cats (Unknown)  Home meds:     PHYSICAL EXAMINATION  T(C): -- HR: -- BP: -- RR: -- SpO2: --  NEUROLOGIC EXAMINATION:  Patient is    GENERAL: not intubated, not in cardiorespiratory distress  EENT:  anicteric  CARDIOVASCULAR: (+) S1 S2, normal rate and regular rhythm  PULMONARY: clear to auscultation bilaterally  ABDOMEN: soft, nontender with normoactive bowel sounds  EXTREMITIES: no edema  SKIN: no rash    LABS:  pending.    01-05 @ 07:01  -  01-05 @ 15:57  IN: 0 mL / OUT: 30 mL / NET: -30 mL    Bacteriology:  CSF studies:  EEG:  Neuroimaging:  Other imaging:    MEDICATIONS:  ·	ceFAZolin  Injectable. 2000 milliGRAM(s) IV Push every 8 hours  ·	acetaminophen     Tablet .. 650 milliGRAM(s) Oral every 6 hours PRN  ·	levETIRAcetam 500 milliGRAM(s) Oral every 12 hours  ·	ondansetron Injectable 4 milliGRAM(s) IV Push every 6 hours PRN    IV FLUIDS:  DRIPS:  DIET:  Lines:  Drains:      Wounds:    CODE STATUS:  Full Code                       GOALS OF CARE:  aggressive                      DISPOSITION:  ICU =================================  NEUROCRITICAL CARE ATTENDING NOTE  =================================    LOURDES DILLARD   MRN-[id number]  Summary:  31y/F with hep B, with family history of aneurysm rupture (grandmother).  She presented with tingling lower extremity, MRA / CTA showed R PComm / R Achoroidal dilatations, Lopthhalmic artery dilatation.  Follow-up MRA c/w enlargement of aneurysm.  Angio 11/2020 showed wide-neck paraophtalmic aneurysm, mild stenosis R ICA dural ring.  Now admitted for elective crani/clipping.  (05 Jan 2022 07:56)    COURSE IN THE HOSPITAL:  01/05 admitted to [facility], s/p OR    Past Medical History: Hepatitis B  Allergies:  Bactrim (Rash) Cats (Unknown)  Home meds:     PHYSICAL EXAMINATION  T(C): 37.8 (01-05 @ 15:15), Max: 37.8 (01-05 @ 15:15) HR: 80 (01-05 @ 16:45) (80 - 97) BP: 114/68 (01-05 @ 16:45) (114/68 - 130/65) RR: 12 (01-05 @ 16:45) (12 - 18) SpO2: 100% (01-05 @ 16:45) (100% - 100%)  NEUROLOGIC EXAMINATION:  Patient is sleepy but easily rousable, follows commands, opens eyes to voice, pupils equal and reactive, EOMs intact, moves all 4s with good strength  GENERAL: not intubated, not in cardiorespiratory distress  EENT:  anicteric  CARDIOVASCULAR: (+) S1 S2, normal rate and regular rhythm  PULMONARY: clear to auscultation bilaterally  ABDOMEN: soft, nontender with normoactive bowel sounds  EXTREMITIES: no edema, no groin hematoma, good distal pulses  SKIN: no rash    LABS: 01-05               12.0   10.53 )-----------( 184      ( 05 Jan 2022 16:18 )             35.1     143  |  111<H>  |  5<L>  ----------------------------<  158<H>  3.9   |  19<L>  |  0.49<L>    Ca    8.7      05 Jan 2022 16:19  Phos  2.9     01-05  Mg     2.0     01-05    TPro  6.4  /  Alb  3.9  /  TBili  0.3  /  DBili  x   /  AST  23  /  ALT  18  /  AlkPhos  51  01-05 01-05 @ 07:01  -  01-05 @ 17:07  IN: 0 mL / OUT: 55 mL / NET: -55 mL    Bacteriology:  CSF studies:  EEG:  Neuroimaging:  Other imaging:    MEDICATIONS:  ·	ceFAZolin  Injectable. 2000 milliGRAM(s) IV Push every 8 hours  ·	acetaminophen     Tablet .. 650 milliGRAM(s) Oral every 6 hours PRN  ·	levETIRAcetam 500 milliGRAM(s) Oral every 12 hours  ·	ondansetron Injectable 4 milliGRAM(s) IV Push every 6 hours PRN    IV FLUIDS: NS@50cc/hr  DRIPS:  DIET: NPO  Lines: Denia (pedal)  Drains:  [place]  Wounds:    CODE STATUS:  Full Code                       GOALS OF CARE:  aggressive                      DISPOSITION:  ICU

## 2022-01-05 NOTE — PROGRESS NOTE ADULT - ASSESSMENT
31y/F with  Hepatitis B        PLAN:   NEURO:  REHAB:  physical therapy evaluation and management    EARLY MOB:      PULM:  Room air, incentive spirometry  CARDIO:  SBP goal 100-150mm Hg  ENDO:  Blood sugar goals 140-180 mg/dL, continue insulin sliding scale  GI:  PPI for GI prophylaxis  DIET:  RENAL:    HEM/ONC:  VTE Prophylaxis:     ID: afebrile, no leukocytosis  ====================   T(F): --      ====================  Social: will update family    ATTENDING ATTESTATION:  I was physically present for the key portions of the evaluation and management (E/M) service provided.  I agree with the above history, physical and plan, which I have reviewed and edited where appropriate.    Patient at high risk for neurological deterioration or death due to:  ICU delirium, aspiration PNA, DVT / PE.  Critical care time:  I have personally provided 45 minutes of critical care time, excluding time spent on separate procedures.      Plan discussed with RN, house staff. 31y/F with  L supraophthalmic aneurysm, s/p clipping (01/05/2022, Dr. Headley, Dr. Townsend)  Asthma  Hepatitis B    PLAN:   NEURO: neurochecks q1h, PRN pain meds with acetaminophen  CT in AM  seizure prophylaxis:  levetiracetam 500mg PO BID, as per neurosurgery   steroid taper over 3 days as per neurosurgery  REHAB:  physical therapy evaluation and management    EARLY MOB: flat x 4 hours then HOB up    PULM:  PRN O2 support to keep sats >/=92%, PRN nebs for wheezing  CARDIO:  SBP goal 100-140mm Hg  ENDO:  Blood sugar goals 140-180 mg/dL, continue insulin sliding scale  GI:  PPI for GI prophylaxis while on steroids  DIET: NPO until more awake then advance as tolerated  RENAL:  IVF until eating well  HEM/ONC: Hb stable  VTE Prophylaxis: SCD, no DVT chemoprophylaxis for now as patient is high risk for bleed (fresh post-op)  ID: afebrile, no leukocytosis  Social: will update family    ATTENDING ATTESTATION:  I was physically present for the key portions of the evaluation and management (E/M) service provided.  I agree with the above history, physical and plan, which I have reviewed and edited where appropriate.    Patient at high risk for neurological deterioration or death due to:  ICU delirium, aspiration PNA, DVT / PE.  Critical care time:  I have personally provided 45 minutes of critical care time, excluding time spent on separate procedures.      Plan discussed with RN, house staff.

## 2022-01-05 NOTE — H&P ADULT - HISTORY OF PRESENT ILLNESS
This information was taken from the chart : "   31 year old woman with no pertinent medical history referred by Dr. Jayden Palmer for discussion of craniotomy for clipping of cerebral aneurysm.  Ms. Reynaga was found to have an incidental intracranial aneurysm in . MRA brain was done due to family history of aneurysm rupture (her maternal grandmother  of ruptured cerebral aneurysm). She was complaining of lower extremity tingling sensation.   MRA and CTA brain from May 2017 demonstrated infundibular dilatations at the origins of the RIGHT posterior communicating artery and RIGHT anterior choroidal. 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 She preferred follow up with repeat MRA but no follow up was done.   She had follow up MRA of the brain done on 10/21/2020 which suggested enlargement of the aneurysm. 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     The case was presented at [facility] Multidisciplinary Cerebrovascular Conference on with recommendations to treat the aneurysm based on age and and family history of SAH from ruptured aneurysm. Treatment options included flow diverting stent or clipping via craniotomy, after discussion at cerebrovascular conference, it was decided that because of her young age and family history, it is appropriate to treat the left supra-ophthalmic aneurysm with flow diversion stenting vs clip ligation.   She returns today for craniotomy for clipping of aneurysm , after a follow-up meeting in which she was informed about treatment modalities and chose to undergo clipping.    She has no history of smoking and no history of HTN.   "

## 2022-01-05 NOTE — BRIEF OPERATIVE NOTE - NSICDXBRIEFPROCEDURE_GEN_ALL_CORE_FT
PROCEDURES:  Angiogram, carotid and cerebral, bilateral 05-Jan-2022 13:24:36  Carol Renee  
PROCEDURES:  Brain aneurysm surgery 05-Jan-2022 15:38:10 clipping of Lt supra ophtalmic aneurysm unruptured Levi Chappell

## 2022-01-05 NOTE — BRIEF OPERATIVE NOTE - OPERATION/FINDINGS
subgleal SHARON left  intraop angio  neuromonitoring intact  intraop angio
Intraop angiogram   4 Polish short sheath was used in the right common femoral artery for access. A diagnostic angiogram was performed under general anesthesia care. Left ICA was injected and studied.     Findings:   There is no filling of the previously seen left ICA paraopthalmic aneurysm.     Full report to follow  Patient tolerated the procedure well and at baseline neurological condition.   Right groin vascular access site was closed with exoseal and applied manual compression.   There is no hematoma.     Above discussed with Dr. Salgado

## 2022-01-05 NOTE — BRIEF OPERATIVE NOTE - NSICDXBRIEFPREOP_GEN_ALL_CORE_FT
PRE-OP DIAGNOSIS:  Brain aneurysm 05-Jan-2022 13:24:51  Carol Renee  
PRE-OP DIAGNOSIS:  Brain aneurysm 05-Jan-2022 13:24:51  Carol Renee

## 2022-01-06 ENCOUNTER — TRANSCRIPTION ENCOUNTER (OUTPATIENT)
Age: 32
End: 2022-01-06

## 2022-01-06 LAB
A1C WITH ESTIMATED AVERAGE GLUCOSE RESULT: 4.7 % — SIGNIFICANT CHANGE UP (ref 4–5.6)
ANION GAP SERPL CALC-SCNC: 9 MMOL/L — SIGNIFICANT CHANGE UP (ref 5–17)
BUN SERPL-MCNC: 6 MG/DL — LOW (ref 7–23)
CALCIUM SERPL-MCNC: 8.4 MG/DL — SIGNIFICANT CHANGE UP (ref 8.4–10.5)
CHLORIDE SERPL-SCNC: 109 MMOL/L — HIGH (ref 96–108)
CO2 SERPL-SCNC: 23 MMOL/L — SIGNIFICANT CHANGE UP (ref 22–31)
CREAT SERPL-MCNC: 0.47 MG/DL — LOW (ref 0.5–1.3)
ESTIMATED AVERAGE GLUCOSE: 88 MG/DL — SIGNIFICANT CHANGE UP (ref 68–114)
GLUCOSE SERPL-MCNC: 139 MG/DL — HIGH (ref 70–99)
HCT VFR BLD CALC: 33.2 % — LOW (ref 34.5–45)
HGB BLD-MCNC: 11 G/DL — LOW (ref 11.5–15.5)
MAGNESIUM SERPL-MCNC: 2 MG/DL — SIGNIFICANT CHANGE UP (ref 1.6–2.6)
MCHC RBC-ENTMCNC: 32.2 PG — SIGNIFICANT CHANGE UP (ref 27–34)
MCHC RBC-ENTMCNC: 33.1 GM/DL — SIGNIFICANT CHANGE UP (ref 32–36)
MCV RBC AUTO: 97.1 FL — SIGNIFICANT CHANGE UP (ref 80–100)
NRBC # BLD: 0 /100 WBCS — SIGNIFICANT CHANGE UP (ref 0–0)
PHOSPHATE SERPL-MCNC: 4.5 MG/DL — SIGNIFICANT CHANGE UP (ref 2.5–4.5)
PLATELET # BLD AUTO: 154 K/UL — SIGNIFICANT CHANGE UP (ref 150–400)
POTASSIUM SERPL-MCNC: 3.9 MMOL/L — SIGNIFICANT CHANGE UP (ref 3.5–5.3)
POTASSIUM SERPL-SCNC: 3.9 MMOL/L — SIGNIFICANT CHANGE UP (ref 3.5–5.3)
RBC # BLD: 3.42 M/UL — LOW (ref 3.8–5.2)
RBC # FLD: 12.4 % — SIGNIFICANT CHANGE UP (ref 10.3–14.5)
SODIUM SERPL-SCNC: 141 MMOL/L — SIGNIFICANT CHANGE UP (ref 135–145)
WBC # BLD: 10.66 K/UL — HIGH (ref 3.8–10.5)
WBC # FLD AUTO: 10.66 K/UL — HIGH (ref 3.8–10.5)

## 2022-01-06 PROCEDURE — 99024 POSTOP FOLLOW-UP VISIT: CPT

## 2022-01-06 PROCEDURE — 99233 SBSQ HOSP IP/OBS HIGH 50: CPT

## 2022-01-06 RX ORDER — PANTOPRAZOLE SODIUM 20 MG/1
40 TABLET, DELAYED RELEASE ORAL
Refills: 0 | Status: DISCONTINUED | OUTPATIENT
Start: 2022-01-06 | End: 2022-01-07

## 2022-01-06 RX ADMIN — Medication 4 MILLIGRAM(S): at 05:18

## 2022-01-06 RX ADMIN — LEVETIRACETAM 500 MILLIGRAM(S): 250 TABLET, FILM COATED ORAL at 17:18

## 2022-01-06 RX ADMIN — PANTOPRAZOLE SODIUM 40 MILLIGRAM(S): 20 TABLET, DELAYED RELEASE ORAL at 12:36

## 2022-01-06 RX ADMIN — Medication 2 MILLIGRAM(S): at 17:17

## 2022-01-06 RX ADMIN — Medication 2 MILLIGRAM(S): at 23:58

## 2022-01-06 RX ADMIN — Medication 2000 MILLIGRAM(S): at 05:19

## 2022-01-06 RX ADMIN — Medication 4 MILLIGRAM(S): at 11:46

## 2022-01-06 RX ADMIN — LEVETIRACETAM 500 MILLIGRAM(S): 250 TABLET, FILM COATED ORAL at 05:19

## 2022-01-06 RX ADMIN — SENNA PLUS 2 TABLET(S): 8.6 TABLET ORAL at 22:25

## 2022-01-06 RX ADMIN — Medication 650 MILLIGRAM(S): at 11:42

## 2022-01-06 RX ADMIN — Medication 650 MILLIGRAM(S): at 11:27

## 2022-01-06 NOTE — PROGRESS NOTE ADULT - ASSESSMENT
31y/F with  L supraophthalmic aneurysm, s/p clipping (01/05/2022, Dr. Headley, Dr. Townsend)  Asthma  Hepatitis B    PLAN:   NEURO: neurochecks q1h, PRN pain meds with acetaminophen  CT in AM  seizure prophylaxis:  levetiracetam 500mg PO BID, as per neurosurgery   steroid taper over 3 days as per neurosurgery  REHAB:  physical therapy evaluation and management    EARLY MOB: flat x 4 hours then HOB up    PULM:  PRN O2 support to keep sats >/=92%, PRN nebs for wheezing  CARDIO:  SBP goal 100-140mm Hg  ENDO:  Blood sugar goals 140-180 mg/dL, continue insulin sliding scale  GI:  PPI for GI prophylaxis while on steroids  DIET: NPO until more awake then advance as tolerated  RENAL:  IVF until eating well  HEM/ONC: Hb stable  VTE Prophylaxis: SCD, no DVT chemoprophylaxis for now as patient is high risk for bleed (fresh post-op)  ID: afebrile, no leukocytosis  Social: will update family    ATTENDING ATTESTATION:  I was physically present for the key portions of the evaluation and management (E/M) service provided.  I agree with the above history, physical and plan, which I have reviewed and edited where appropriate.    Patient at high risk for neurological deterioration or death due to:  ICU delirium, aspiration PNA, DVT / PE.  Critical care time:  I have personally provided 45 minutes of critical care time, excluding time spent on separate procedures.      Plan discussed with RN, house staff. 31y/F with  L supraophthalmic aneurysm, s/p clipping (01/05/2022, Dr. Headley, Dr. Townsend)  Asthma  Hepatitis B    PLAN:   NEURO: neurochecks q4h, PRN pain meds with acetaminophen, opiates  seizure prophylaxis:  levetiracetam 500mg PO BID, as per neurosurgery   steroid taper over 3 days as per neurosurgery  REHAB:  physical therapy evaluation and management    EARLY MOB: OOB to chair, ambulate    PULM:  room air, PRN nebs for wheezing  CARDIO:  SBP goal 100-140mm Hg  ENDO:  Blood sugar goals 140-180 mg/dL, continue insulin sliding scale  GI:  PPI for GI prophylaxis while on steroids  DIET: encourage PO intake  RENAL:  IVF locked  HEM/ONC: Hb stable  VTE Prophylaxis: SCD, start SQL tonight if ok with NSG  ID: afebrile, no leukocytosis  Social: will update family    ATTENDING ATTESTATION:  I was physically present for the key portions of the evaluation and management (E/M) service provided.  I agree with the above history, physical and plan which I have reviewed and edited where appropriate.     Patient not at high risk for neurologic deterioration / death.  Time spent on this noncritically ill patient: 45 minutes spent on total encounter, more than 50% of the visit was spent counseling and/or coordinating care by the attending physician.    Plan discussed with RN, house staff.    REVIEW OF SYSTEMS:  No headaches, no nausea or vomiting; 14 -point review of systems otherwise unremarkable.        ICU stepdown Checklist:    Completed: 01-06 @ 10:08    [X] hemodynamically stable – VS WNL and stable x 24hours, UO adequate  [n/a ] if  previously on HDA - off pressors x 24h with stable neuro exam    [X] no new symptoms x 24h (i.e. new fever, new-onset nausea/vomiting)  [X] stable labs: (i.e. WBC not rising, sodium not dropping)  [X] patient not at high risk for aspiration, if high risk then:                  [ ] should have definitive plans for trach/PEG (alternative option is to discharge from ICU to facilty)                  [ ] stepdown to bed close to nurse’s station  [n/a] low suctioning requirements (i.e. q4h or less)  [X] sign-off from primary RN*  [X] drains do not require ICU level of care  [X] if patient previously agitated or with behavioral issues – controlled   [X] pain controlled

## 2022-01-06 NOTE — PROGRESS NOTE ADULT - SUBJECTIVE AND OBJECTIVE BOX
HPI:  This information was taken from the chart : "   31 year old woman with no pertinent medical history referred by Dr. Jayden Palmer for discussion of craniotomy for clipping of cerebral aneurysm.  Ms. Reynaga was found to have an incidental intracranial aneurysm in . MRA brain was done due to family history of aneurysm rupture (her maternal grandmother  of ruptured cerebral aneurysm). She was complaining of lower extremity tingling sensation.   MRA and CTA brain from May 2017 demonstrated infundibular dilatations at the origins of the RIGHT posterior communicating artery and RIGHT anterior choroidal. 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 She preferred follow up with repeat MRA but no follow up was done.   She had follow up MRA of the brain done on 10/21/2020 which suggested enlargement of the aneurysm. 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     The case was presented at [facility] Multidisciplinary Cerebrovascular Conference on with recommendations to treat the aneurysm based on age and and family history of SAH from ruptured aneurysm. Treatment options included flow diverting stent or clipping via craniotomy, after discussion at cerebrovascular conference, it was decided that because of her young age and family history, it is appropriate to treat the left supra-ophthalmic aneurysm with flow diversion stenting vs clip ligation.   She returns today for craniotomy for clipping of aneurysm , after a follow-up meeting in which she was informed about treatment modalities and chose to undergo clipping.    She has no history of smoking and no history of HTN.   "       (2022 07:56)    Hospital Course:  : POD0 L clipping crani of supra opthalmic aneurysm with intra-op angio : POD1 L crani clipping of supraophthalmic aneurysm w/intraop angio.        Vital Signs Last 24 Hrs  T(C): 37.6 (2022 02:01), Max: 38.1 (2022 20:00)  T(F): 99.7 (2022 02:01), Max: 100.6 (2022 20:00)  HR: 73 (2022 02:00) (72 - 97)  BP: 103/71 (2022 02:00) (103/71 - 130/65)  BP(mean): 83 (2022 02:00) (81 - 88)  RR: 15 (2022 02:00) (12 - 18)  SpO2: 100% (2022 02:00) (100% - 100%)    I&O's Summary    2022 07:01  -  2022 02:51  --------------------------------------------------------  IN: 840 mL / OUT: 825 mL / NET: 15 mL        PHYSICAL EXAM:  General: NAD, pt is comfortably sitting up in bed, on room air  HEENT: CN II-XII grossly intact, PERRL 3mm briskly reactive, EOMI b/l, face symmetric, tongue midline, neck FROM  Cardiovascular: RRR, normal S1 and S2   Respiratory: lungs CTAB, no wheezing, rhonchi, or crackles   GI: normoactive BS to auscultation, abd soft, NTND   Neuro: A&Ox3, No aphasia, speech clear, no dysmetria, no pronator drift. Follows commands.  VALDEZ x4 spontaneously, 5/5 strength in all extremities throughout. SILT throughout   Extremities: distal pulses 2+ x4  Wound/incision: headwrap in place, C/D/I. Anterior neck incision w/ dressing C/D/I. R groin dressing C/D/I without hematoma    TUBES/LINES:  [x] Mcgowan  [] Lumbar Drain  [x] Wound Drains: L subgaleal SHARON in place to suction  [] Others    DIET:  [x] NPO  [] Mechanical  [] Tube feeds    LABS:                        12.0   10.53 )-----------( 184      ( 2022 16:18 )             35.1     01-    143  |  111<H>  |  5<L>  ----------------------------<  158<H>  3.9   |  19<L>  |  0.49<L>    Ca    8.7      2022 16:19  Phos  2.9     -05  Mg     2.0     -    TPro  6.4  /  Alb  3.9  /  TBili  0.3  /  DBili  x   /  AST  23  /  ALT  18  /  AlkPhos  51  -05            CAPILLARY BLOOD GLUCOSE          Drug Levels: [] N/A    CSF Analysis: [] N/A      Allergies    Bactrim (Rash)  Cats (Unknown)    Intolerances      MEDICATIONS:  Antibiotics:  ceFAZolin  Injectable. 2000 milliGRAM(s) IV Push every 8 hours    Neuro:  acetaminophen     Tablet .. 650 milliGRAM(s) Oral every 6 hours PRN  levETIRAcetam 500 milliGRAM(s) Oral every 12 hours  ondansetron Injectable 4 milliGRAM(s) IV Push every 6 hours PRN    Anticoagulation:    OTHER:  chlorhexidine 2% Cloths 1 Application(s) Topical <User Schedule>  dexAMETHasone  Injectable   IV Push   dexAMETHasone  Injectable 4 milliGRAM(s) IV Push every 6 hours  dexAMETHasone  Injectable 2 milliGRAM(s) IV Push every 6 hours  dextrose 40% Gel 15 Gram(s) Oral once  dextrose 50% Injectable 25 Gram(s) IV Push once  dextrose 50% Injectable 12.5 Gram(s) IV Push once  dextrose 50% Injectable 25 Gram(s) IV Push once  glucagon  Injectable 1 milliGRAM(s) IntraMuscular once  influenza   Vaccine 0.5 milliLiter(s) IntraMuscular once  insulin lispro (ADMELOG) corrective regimen sliding scale   SubCutaneous three times a day before meals  pantoprazole  Injectable 40 milliGRAM(s) IV Push daily  senna 2 Tablet(s) Oral at bedtime    IVF:  dextrose 5%. 1000 milliLiter(s) IV Continuous <Continuous>  dextrose 5%. 1000 milliLiter(s) IV Continuous <Continuous>  sodium chloride 0.9%. 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 Patient now s/p L clipping crani of supra opthalmic aneurysm with intra-op angio 22    I67.1    Handoff    Hepatitis B    Brain aneurysm    Brain aneurysm    Brain aneurysm    Angiogram, carotid and cerebral, bilateral    Angiogram, carotid and cerebral, bilateral    Brain aneurysm surgery    History of coronary angiogram    [email or web address]      Plan:  NEURO:  -Neuro/vital/groin/vascular checks q1H  -Pain control PRN tylenol  -Decadron taper x 3 days to off  -Monitor SHARON output  -Keppra 500 BID for seizure prophylaxis    CARDIO:  -SBP goal < 140    PULM:  -Satting well on RA    GI:  -ADAT  -Bowel regimen PRN  -PPI while on decadron    RENAL:  -Na goal 135-145  -IVF until adequate PO intake    ENDO:  -ISS while on Decadron  -F/u A1C in AM    ID:  -Postop ancef  -Afebrile    HEME:  -SCDs for DVT ppx  -Trend H/H post op    DISPO: ICU status, full code, pending PT/OT  Case discussed with Dr. Headley and Dr Tapia, family updated with plan

## 2022-01-06 NOTE — PROGRESS NOTE ADULT - SUBJECTIVE AND OBJECTIVE BOX
=================================  NEUROCRITICAL CARE ATTENDING NOTE  =================================    LOURDES DILLARD   MRN-[id number]  Summary:  31y/F with hep B, with family history of aneurysm rupture (grandmother).  She presented with tingling lower extremity, MRA / CTA showed R PComm / R Achoroidal dilatations, L opthhalmic artery dilatation.  Follow-up MRA c/w enlargement of aneurysm.  Angio 11/2020 showed wide-neck paraophtalmic aneurysm, mild stenosis R ICA dural ring.  Now admitted for elective crani/clipping.  (05 Jan 2022 07:56)    COURSE IN THE HOSPITAL:  01/05 admitted to [facility], s/p OR  01/06 Tmax 38.1    Past Medical History: Hepatitis B  Allergies:  Bactrim (Rash) Cats (Unknown)  Home meds:     PHYSICAL EXAMINATION  T(C): 37.4 (01-06 @ 05:23), Max: 38.1 (01-05 @ 20:00) HR: 67 (01-06 @ 07:00) (67 - 97) BP: 97/66 (01-06 @ 07:00) (94/65 - 130/65) RR: 15 (01-06 @ 07:00) (12 - 18) SpO2: 100% (01-06 @ 07:00) (100% - 100%)  NEUROLOGIC EXAMINATION:  Patient is sleepy but easily rousable, follows commands, opens eyes to voice, pupils equal and reactive, EOMs intact, moves all 4s with good strength  GENERAL: not intubated, not in cardiorespiratory distress  EENT:  anicteric  CARDIOVASCULAR: (+) S1 S2, normal rate and regular rhythm  PULMONARY: clear to auscultation bilaterally  ABDOMEN: soft, nontender with normoactive bowel sounds  EXTREMITIES: no edema, no groin hematoma, good distal pulses  SKIN: no rash    LABS: 01-06             (10.53)  11.0 (12.0)  10.66 )-----------( 154      ( 06 Jan 2022 04:33 )             33.2     141  |  109<H>  |  6<L>  ----------------------------<  139<H>  3.9   |  23  |  0.47<L>    Ca    8.4      06 Jan 2022 04:33  Phos  4.5     01-06  Mg     2.0     01-06    TPro  6.4  /  Alb  3.9  /  TBili  0.3  /  DBili  x   /  AST  23  /  ALT  18  /  AlkPhos  51  01-05 01-05 @ 07:01 - 01-06 @ 07:00  IN: 1090 mL / OUT: 1790 mL / NET: -700 mL    Bacteriology:  CSF studies:  EEG:  Neuroimaging:  Other imaging:    MEDICATIONS: 01-06    ·	levETIRAcetam 500 Oral every 12 hours  ·	pantoprazole  Injectable 40 IV Push daily  ·	senna 2 Oral at bedtime  ·	dexAMETHasone  Injectable 4 IV Push every 6 hours  ·	dexAMETHasone  Injectable 2 IV Push every 6 hours  ·	insulin lispro (ADMELOG) corrective regimen sliding scale  SubCutaneous three times a day before meals  ·	acetaminophen     Tablet .. 650 Oral every 6 hours PRN  ·	ondansetron Injectable 4 IV Push every 6 hours PRN     IV FLUIDS: NS@50cc/hr  DRIPS:  DIET: NPO  Lines: Denia (pedal)  Drains:  Mcgowan  Wounds:    CODE STATUS:  Full Code                       GOALS OF CARE:  aggressive                      DISPOSITION:  ICU =================================  NEUROCRITICAL CARE ATTENDING NOTE  =================================    LOURDES DILLARD   MRN-[id number]  Summary:  31y/F with hep B, with family history of aneurysm rupture (grandmother).  She presented with tingling lower extremity, MRA / CTA showed R PComm / R Achoroidal dilatations, L opthhalmic artery dilatation.  Follow-up MRA c/w enlargement of aneurysm.  Angio 11/2020 showed wide-neck paraophtalmic aneurysm, mild stenosis R ICA dural ring.  Now admitted for elective crani/clipping.  (05 Jan 2022 07:56)    COURSE IN THE HOSPITAL:  01/05 admitted to [facility], s/p OR  01/06 Tmax 38.1    Past Medical History: Hepatitis B  Allergies:  Bactrim (Rash) Cats (Unknown)  Home meds:     PHYSICAL EXAMINATION  T(C): 37.4 (01-06 @ 05:23), Max: 38.1 (01-05 @ 20:00) HR: 67 (01-06 @ 07:00) (67 - 97) BP: 97/66 (01-06 @ 07:00) (94/65 - 130/65) RR: 15 (01-06 @ 07:00) (12 - 18) SpO2: 100% (01-06 @ 07:00) (100% - 100%)  NEUROLOGIC EXAMINATION:  Patient is awake, alert, fully oriented, follows commands, opens eyes to voice, pupils equal and reactive, EOMs intact, moves all 4s with good strength  GENERAL: not intubated, not in cardiorespiratory distress  EENT:  anicteric  CARDIOVASCULAR: (+) S1 S2, normal rate and regular rhythm  PULMONARY: clear to auscultation bilaterally  ABDOMEN: soft, nontender with normoactive bowel sounds  EXTREMITIES: no edema, no groin hematoma, good distal pulses  SKIN: no rash; wound clean dry, no discharge, no tracheal deviation, no swelling neck    LABS: 01-06             (10.53)  11.0 (12.0)  10.66 )-----------( 154      ( 06 Jan 2022 04:33 )             33.2     141  |  109<H>  |  6<L>  ----------------------------<  139<H>  3.9   |  23  |  0.47<L>    Ca    8.4      06 Jan 2022 04:33  Phos  4.5     01-06  Mg     2.0     01-06    TPro  6.4  /  Alb  3.9  /  TBili  0.3  /  DBili  x   /  AST  23  /  ALT  18  /  AlkPhos  51  01-05 01-05 @ 07:01  -  01-06 @ 07:00  IN: 1090 mL / OUT: 1790 mL / NET: -700 mL    Bacteriology:  CSF studies:  EEG:  Neuroimaging:  Other imaging:    MEDICATIONS: 01-06    ·	levETIRAcetam 500 Oral every 12 hours  ·	pantoprazole  Injectable 40 IV Push daily  ·	senna 2 Oral at bedtime  ·	dexAMETHasone  Injectable 4 IV Push every 6 hours  ·	dexAMETHasone  Injectable 2 IV Push every 6 hours  ·	insulin lispro (ADMELOG) corrective regimen sliding scale  SubCutaneous three times a day before meals  ·	acetaminophen     Tablet .. 650 Oral every 6 hours PRN  ·	ondansetron Injectable 4 IV Push every 6 hours PRN     IV FLUIDS: NS@50  DRIPS:  DIET: regular   Lines:   Drains:    Wounds:    CODE STATUS:  Full Code                       GOALS OF CARE:  aggressive                      DISPOSITION:  stepdown You can access the FollowMyHealth Patient Portal offered by [facility] by registering at the following website: [email or web address]. By joining [employer]’s FollowMyHealth portal, you will also be able to view your health information using other applications (apps) compatible with our system.

## 2022-01-07 ENCOUNTER — TRANSCRIPTION ENCOUNTER (OUTPATIENT)
Age: 32
End: 2022-01-07

## 2022-01-07 VITALS — TEMPERATURE: 99 F

## 2022-01-07 PROBLEM — B19.10 UNSPECIFIED VIRAL HEPATITIS B WITHOUT HEPATIC COMA: Chronic | Status: ACTIVE | Noted: 2022-01-04

## 2022-01-07 LAB
ANION GAP SERPL CALC-SCNC: 8 MMOL/L — SIGNIFICANT CHANGE UP (ref 5–17)
BUN SERPL-MCNC: 9 MG/DL — SIGNIFICANT CHANGE UP (ref 7–23)
CALCIUM SERPL-MCNC: 8.8 MG/DL — SIGNIFICANT CHANGE UP (ref 8.4–10.5)
CHLORIDE SERPL-SCNC: 109 MMOL/L — HIGH (ref 96–108)
CO2 SERPL-SCNC: 24 MMOL/L — SIGNIFICANT CHANGE UP (ref 22–31)
CREAT SERPL-MCNC: 0.45 MG/DL — LOW (ref 0.5–1.3)
GLUCOSE SERPL-MCNC: 114 MG/DL — HIGH (ref 70–99)
HCT VFR BLD CALC: 34.3 % — LOW (ref 34.5–45)
HGB BLD-MCNC: 11.7 G/DL — SIGNIFICANT CHANGE UP (ref 11.5–15.5)
MAGNESIUM SERPL-MCNC: 2.4 MG/DL — SIGNIFICANT CHANGE UP (ref 1.6–2.6)
MCHC RBC-ENTMCNC: 33.1 PG — SIGNIFICANT CHANGE UP (ref 27–34)
MCHC RBC-ENTMCNC: 34.1 GM/DL — SIGNIFICANT CHANGE UP (ref 32–36)
MCV RBC AUTO: 97.2 FL — SIGNIFICANT CHANGE UP (ref 80–100)
NRBC # BLD: 0 /100 WBCS — SIGNIFICANT CHANGE UP (ref 0–0)
PHOSPHATE SERPL-MCNC: 3.5 MG/DL — SIGNIFICANT CHANGE UP (ref 2.5–4.5)
PLATELET # BLD AUTO: 179 K/UL — SIGNIFICANT CHANGE UP (ref 150–400)
POTASSIUM SERPL-MCNC: 4 MMOL/L — SIGNIFICANT CHANGE UP (ref 3.5–5.3)
POTASSIUM SERPL-SCNC: 4 MMOL/L — SIGNIFICANT CHANGE UP (ref 3.5–5.3)
RBC # BLD: 3.53 M/UL — LOW (ref 3.8–5.2)
RBC # FLD: 13 % — SIGNIFICANT CHANGE UP (ref 10.3–14.5)
SODIUM SERPL-SCNC: 141 MMOL/L — SIGNIFICANT CHANGE UP (ref 135–145)
WBC # BLD: 10.73 K/UL — HIGH (ref 3.8–10.5)
WBC # FLD AUTO: 10.73 K/UL — HIGH (ref 3.8–10.5)

## 2022-01-07 PROCEDURE — 86850 RBC ANTIBODY SCREEN: CPT

## 2022-01-07 PROCEDURE — 85018 HEMOGLOBIN: CPT

## 2022-01-07 PROCEDURE — 84100 ASSAY OF PHOSPHORUS: CPT

## 2022-01-07 PROCEDURE — 80048 BASIC METABOLIC PNL TOTAL CA: CPT

## 2022-01-07 PROCEDURE — 76000 FLUOROSCOPY <1 HR PHYS/QHP: CPT

## 2022-01-07 PROCEDURE — 82947 ASSAY GLUCOSE BLOOD QUANT: CPT

## 2022-01-07 PROCEDURE — 82962 GLUCOSE BLOOD TEST: CPT

## 2022-01-07 PROCEDURE — 83735 ASSAY OF MAGNESIUM: CPT

## 2022-01-07 PROCEDURE — C1713: CPT

## 2022-01-07 PROCEDURE — 85025 COMPLETE CBC W/AUTO DIFF WBC: CPT

## 2022-01-07 PROCEDURE — 86923 COMPATIBILITY TEST ELECTRIC: CPT

## 2022-01-07 PROCEDURE — 86901 BLOOD TYPING SEROLOGIC RH(D): CPT

## 2022-01-07 PROCEDURE — 80053 COMPREHEN METABOLIC PANEL: CPT

## 2022-01-07 PROCEDURE — 86900 BLOOD TYPING SEROLOGIC ABO: CPT

## 2022-01-07 PROCEDURE — 82330 ASSAY OF CALCIUM: CPT

## 2022-01-07 PROCEDURE — C1889: CPT

## 2022-01-07 PROCEDURE — 84132 ASSAY OF SERUM POTASSIUM: CPT

## 2022-01-07 PROCEDURE — 97161 PT EVAL LOW COMPLEX 20 MIN: CPT

## 2022-01-07 PROCEDURE — 36415 COLL VENOUS BLD VENIPUNCTURE: CPT

## 2022-01-07 PROCEDURE — 84295 ASSAY OF SERUM SODIUM: CPT

## 2022-01-07 PROCEDURE — 85027 COMPLETE CBC AUTOMATED: CPT

## 2022-01-07 PROCEDURE — 83036 HEMOGLOBIN GLYCOSYLATED A1C: CPT

## 2022-01-07 PROCEDURE — 99024 POSTOP FOLLOW-UP VISIT: CPT

## 2022-01-07 RX ORDER — LEVETIRACETAM 250 MG/1
1 TABLET, FILM COATED ORAL
Qty: 60 | Refills: 0
Start: 2022-01-07 | End: 2022-02-05

## 2022-01-07 RX ORDER — POLYETHYLENE GLYCOL 3350 17 G/17G
17 POWDER, FOR SOLUTION ORAL
Qty: 170 | Refills: 0
Start: 2022-01-07 | End: 2022-01-16

## 2022-01-07 RX ORDER — ACETAMINOPHEN 500 MG
2 TABLET ORAL
Qty: 0 | Refills: 0 | DISCHARGE
Start: 2022-01-07

## 2022-01-07 RX ORDER — DEXAMETHASONE 0.5 MG/5ML
1 ELIXIR ORAL
Qty: 4 | Refills: 0
Start: 2022-01-07 | End: 2022-01-07

## 2022-01-07 RX ORDER — PANTOPRAZOLE SODIUM 20 MG/1
1 TABLET, DELAYED RELEASE ORAL
Qty: 1 | Refills: 0
Start: 2022-01-07 | End: 2022-01-07

## 2022-01-07 RX ADMIN — CHLORHEXIDINE GLUCONATE 1 APPLICATION(S): 213 SOLUTION TOPICAL at 06:23

## 2022-01-07 RX ADMIN — Medication 2 MILLIGRAM(S): at 06:17

## 2022-01-07 RX ADMIN — PANTOPRAZOLE SODIUM 40 MILLIGRAM(S): 20 TABLET, DELAYED RELEASE ORAL at 06:17

## 2022-01-07 RX ADMIN — Medication 2 MILLIGRAM(S): at 11:07

## 2022-01-07 RX ADMIN — LEVETIRACETAM 500 MILLIGRAM(S): 250 TABLET, FILM COATED ORAL at 06:17

## 2022-01-07 NOTE — DISCHARGE NOTE PROVIDER - NSDCMRMEDTOKEN_GEN_ALL_CORE_FT
acetaminophen 325 mg oral tablet: 2 tab(s) orally every 6 hours, As needed, Temp greater or equal to 38C (100.4F), Mild Pain (1 - 3)  dexamethasone 1 mg oral tablet: 1 tab(s) orally every 6 hours   levETIRAcetam 500 mg oral tablet: 1 tab(s) orally every 12 hours  MiraLax oral powder for reconstitution: 17 gram(s) orally once a day, As Needed -for constipation   pantoprazole 40 mg oral delayed release tablet: 1 tab(s) orally once a day (before a meal)

## 2022-01-07 NOTE — DISCHARGE NOTE PROVIDER - NSDCCPTREATMENT_GEN_ALL_CORE_FT
PRINCIPAL PROCEDURE  Procedure: Brain aneurysm surgery  Findings and Treatment: clipping of Lt supra ophtalmic aneurysm unruptured

## 2022-01-07 NOTE — DISCHARGE NOTE PROVIDER - NSDCFUADDINST_GEN_ALL_CORE_FT
Neurosurgery follow up appointment date/time:  - Your incision staples will be removed at your follow up appointment with Dr. Headley.  - please call the office to confirm appointment:     Wound Care:  - can patient shower?  - does dressing need to be changed/removed?    Activity:  - fatigue is common after surgery, rest if you feel tired   - no bending, lifting, twisting or heavy lifting   - walking is recommended, ambulate as tolerated  - you may shower when you get home, keep your incision dry  - no bathing   - no driving within 24 hours of anesthesia or while taking prescription pain medications   - keep hydrated, drink plenty of water   Please also follow up with your primary care doctor.     Pain Expectations:  - pain after surgery is expected  - please take pain meds as prescribed     Medications:  - changes to home meds (ex. AED's)?  - new meds?  - pain meds?  - when can antiplatelets or anticoagulants be restarted?  - were adverse affects of meds discussed with patients?   - pain medications can cause constipation, you should eat a high fiber diet and may take a stool softener while on pain meds   - Avoid taking Advil (ibuprofen), Motrin (naproxen), or Aspirin for pain as they can cause bleeding     Call the office or come to ED if:  - wound has drainage or bleeding, increased redness or pain at incision site, neurological change, fever (>101), chills, night sweats, syncope, nausea/vomiting      Playback:  - are discharge instruction on playback?  - is a picture of the incision on playback?     WITHIN 24 HOURS OF DISCHARGE, PLEASE CONTACT NEURO PA  WITH ANY QUESTIONS OR CONCERNS: [phone]   OTHERWISE, PLEASE CALL THE OFFICE WITH ANY QUESTIONS OR CONCERNS:  Neurosurgery follow up appointment date/time: 1/19/22 at 10AM  - Your incision staples will be removed at your follow up appointment with Dr. Headley.  - please call the office to confirm appointment: [phone]    Wound Care:  - you may shower and wash your hair with shampoo  - please leave incision open to air    Activity:  - fatigue is common after surgery, rest if you feel tired   - no bending, lifting, twisting or heavy lifting   - walking is recommended, ambulate as tolerated  - you may shower when you get home, keep your incision dry  - no bathing   - no driving within 24 hours of anesthesia or while taking prescription pain medications   - keep hydrated, drink plenty of water     Please also follow up with your primary care doctor.     Pain Expectations:  - pain after surgery is expected  - please take pain meds as prescribed     Medications:  - continue home meds as prescribed   - new meds: Keppra 500mg twice daily for one month for seizure prophylaxis. Dexamethasone taper for one more day for inflammation. Protonix while on Decadron for stomach ulcer prevention  - pain meds: Tylenol as needed    - pain medications can cause constipation, you should eat a high fiber diet and may take a stool softener while on pain meds   - Avoid taking Advil (ibuprofen), Motrin (naproxen), or Aspirin for pain as they can cause bleeding     Call the office or come to ED if:  - wound has drainage or bleeding, increased redness or pain at incision site, neurological change, fever (>101), chills, night sweats, syncope, nausea/vomiting      Playback:  - please see [employer] health for a copy of your discharge paperwork     WITHIN 24 HOURS OF DISCHARGE, PLEASE CONTACT NEURO PA  WITH ANY QUESTIONS OR CONCERNS: [phone]   OTHERWISE, PLEASE CALL THE OFFICE WITH ANY QUESTIONS OR CONCERNS: [phone]

## 2022-01-07 NOTE — PHYSICAL THERAPY INITIAL EVALUATION ADULT - GAIT DEVIATIONS NOTED, PT EVAL
Demo appropriate thomas, maintained fairly steady gait, no LOB and good navigation of hallway obstacles.

## 2022-01-07 NOTE — DISCHARGE NOTE PROVIDER - HOSPITAL COURSE
HPI:  31 year old woman with hx of asthma and hep b referred by Dr. Jayden Palmer for discussion of craniotomy for clipping of cerebral aneurysm.  Ms. Reynaga was found to have an incidental intracranial aneurysm in . MRA brain was done due to family history of aneurysm rupture (her maternal grandmother  of ruptured cerebral aneurysm). She was complaining of lower extremity tingling sensation.   MRA and CTA brain from May 2017 demonstrated infundibular dilatations at the origins of the RIGHT posterior communicating artery and RIGHT anterior choroidal. 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 She preferred follow up with repeat MRA but no follow up was done.   She had follow up MRA of the brain done on 10/21/2020 which suggested enlargement of the aneurysm. 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     Hospital Course:  : POD0 L clipping crani of supra opthalmic aneurysm with intra-op angio : POD1 L crani clipping of supraophthalmic aneurysm w/intraop angio.  thomas d/c. stepped down.   : POD2, PATRIA o/n, neuro stable, pending PT/OT    Patient evaluated by PT/OT who recommend: pending  Patient is going home? rehab? hospice? Facility Name:     Hospital course uncomplicated.    Checklist:   - Obtained follow up appointment from NP  - Reviewed final recommendations of inpatient consults  - Neurologically stable for discharge  - Vitals stable for discharge   - Afebrile for discharge  - WBC is stable  - Sodium level is normal  - Pain is adequately controlled  - Pt has PICC/walker/brace/collar        HPI:  31 year old woman with hx of asthma and hep b referred by Dr. Jayden Palmer for discussion of craniotomy for clipping of cerebral aneurysm.  Ms. Reynaga was found to have an incidental intracranial aneurysm in . MRA brain was done due to family history of aneurysm rupture (her maternal grandmother  of ruptured cerebral aneurysm). She was complaining of lower extremity tingling sensation.   MRA and CTA brain from May 2017 demonstrated infundibular dilatations at the origins of the RIGHT posterior communicating artery and RIGHT anterior choroidal. 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 She preferred follow up with repeat MRA but no follow up was done.   She had follow up MRA of the brain done on 10/21/2020 which suggested enlargement of the aneurysm. 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     Hospital Course:  : POD0 L clipping crani of supra opthalmic aneurysm with intra-op angio : POD1 L crani clipping of supraophthalmic aneurysm w/intraop angio.  thomas d/c. stepped down.   : POD2, PATRIA o/n, neuro stable, pending PT/OT    Patient evaluated by PT/OT who recommend: home with no needs    Hospital course uncomplicated.    Patient is neuro stable, vitals stable, afebrile, pain adequately controlled, medically ready for discharge

## 2022-01-07 NOTE — PHYSICAL THERAPY INITIAL EVALUATION ADULT - DISCHARGE DISPOSITION, PT EVAL
and assist from boyfriend at home, as needed. Discussed with DELIA De La Torre and TROY Dale (nsx)/home/no skilled PT needs

## 2022-01-07 NOTE — PHYSICAL THERAPY INITIAL EVALUATION ADULT - GENERAL OBSERVATIONS, REHAB EVAL
PT IE completed. Patient received semi supine in bed +tele, +cranial incision C/D/I, +(B) wrist heplock IV's, NAD, willing to work with PT

## 2022-01-07 NOTE — DISCHARGE NOTE PROVIDER - NSDCADMDATE_GEN_ALL_CORE_FT
Protocol For Photochemotherapy: Triamcinolone Ointment And Nbuvb: The patient received Photochemotherapy: Triamcinolone and NBUVB (triamcinolone ointment applied to all lesions prior to phototherapy). Protocol For Photochemotherapy For Severe Photoresponsive Dermatoses: Petrolatum And Nbuvb: The patient received Photochemotherapy for severe photoresponsive dermatoses: Petrolatum and NBUVB requiring at least 4 to 8 hours of care under direct physician supervision. Additional Shield Locations: Glasses Total Body Time: 1:58 Protocol For Protocol For Photochemotherapy For Severe Photoresponsive Dermatoses: Bath Puva: The patient received Photochemotherapy for severe photoresponsive dermatoses: Bath PUVA requiring at least 4 to 8 hours of care under direct physician supervision. Protocol For Photochemotherapy: Mineral Oil And Broad Band Uvb: The patient received Photochemotherapy: Mineral Oil and Broad Band UVB. Protocol For Photochemotherapy For Severe Photoresponsive Dermatoses: Tar And Nbuvb (Goeckerman Treatment): The patient received Photochemotherapy for severe photoresponsive dermatoses: Tar and NBUVB (Goeckerman treatment) requiring at least 4 to 8 hours of care under direct physician supervision. Protocol For Nb Uva: The patient received NB UVA. 05-Jan-2022 05:29 Protocol For Photochemotherapy: Tar And Broad Band Uvb (Goeckerman Treatment): The patient received Photochemotherapy: Tar and Broad Band UVB (Goeckerman treatment). Protocol For Photochemotherapy: Petrolatum And Nbuvb: The patient received Photochemotherapy: Petrolatum and NBUVB (petrolatum applied to all lesions prior to phototherapy). Protocol For Puva: The patient received PUVA. Protocol For Photochemotherapy: Baby Oil And Nbuvb: The patient received Photochemotherapy: Baby Oil and NBUVB (baby oil applied to all lesions prior to phototherapy). Protocol For Broad Band Uvb: The patient received Broad Band UVB. Protocol For Nbuvb: The patient received NBUVB. Protocol For Photochemotherapy: Petrolatum And Broad Band Uvb: The patient received Photochemotherapy: Petrolatum and Broad Band UVB. Post-Care Instructions: I reviewed with the patient in detail post-care instructions. Patient is to wear sun protection. Patients may expect sunburn like redness, discomfort and scabbing. Detail Level: Zone Skin Type: I Consent: Written consent obtained. The risks were reviewed with the patient including but not limited to: burn, pigmentary changes, pain, blistering, scabbing, redness, increased risk of skin cancers, and the remote possibility of scarring. Protocol For Photochemotherapy For Severe Photoresponsive Dermatoses: Tar And Broad Band Uvb (Goeckerman Treatment): The patient received Photochemotherapy for severe photoresponsive dermatoses: Tar and Broad Band UVB (Goeckerman treatment) requiring at least 4 to 8 hours of care under direct physician supervision. Protocol For Photochemotherapy For Severe Photoresponsive Dermatoses: Puva: The patient received Photochemotherapy for severe photoresponsive dermatoses: PUVA requiring at least 4 to 8 hours of care under direct physician supervision. Render Post-Care In The Note: no Protocol For Photochemotherapy For Severe Photoresponsive Dermatoses: Petrolatum And Broad Band Uvb: The patient received Photochemotherapyfor severe photoresponsive dermatoses: Petrolatum and Broad Band UVB requiring at least 4 to 8 hours of care under direct physician supervision. Protocol For Uva1: The patient received UVA1. Comments On Previous Treatment: Patient takes a stool in steve and requires help putting on oil. Total Body Energy: 477 mj Protocol For Uva: The patient received UVA. Protocol: Photochemotherapy: Mineral Oil and NBUVB Protocol For Bath Puva: The patient received Bath PUVA. Total Treatment Time: 20 Minutes Name Of Supervising Technician: [place] [employer]rradiance (Optional- Include Units): 4.05 Protocol For Photochemotherapy: Mineral Oil And Nbuvb: The patient received Photochemotherapy: Mineral Oil and NBUVB (mineral oil applied to all lesions prior to phototherapy). Protocol For Photochemotherapy: Tar And Nbuvb (Goeckerman Treatment): The patient received Photochemotherapy: Tar and NBUVB (Goeckerman treatment). Treatment Number: [facility]

## 2022-01-07 NOTE — OCCUPATIONAL THERAPY INITIAL EVALUATION ADULT - MODALITIES TREATMENT COMMENTS
Cranial Nerves II - XII: II: PERRLA; visual fields are full to confrontation III, IV, VI: EOMI, L eye slight lateral nystagmus noted during tracking  V: facial sensation intact to light touch V1-V3 b/l VII: no ptosis, no facial droop, limited L eye brow raise 2/2 head pain VIII: hearing intact to finger rub b/l  XI: head turning and shoulder shrug intact b/l XII: tongue protrusion midline.  Vision Quadrant Test: WFL

## 2022-01-07 NOTE — OCCUPATIONAL THERAPY INITIAL EVALUATION ADULT - ADDITIONAL COMMENTS
Pt lives with her boyfriend in a 4th floor walkup apartment. Prior to admit, pt independent with all ADLs/IADLs and ambulation, did not require any DME or AD

## 2022-01-07 NOTE — PROGRESS NOTE ADULT - REASON FOR ADMISSION
Clipping of letf para ophtalmic aneurysm
Clipping of left para ophtalmic aneurysm
Clipping of letf para ophtalmic aneurysm

## 2022-01-07 NOTE — DISCHARGE NOTE NURSING/CASE MANAGEMENT/SOCIAL WORK - PATIENT PORTAL LINK FT
You can access the FollowMyHealth Patient Portal offered by [facility] by registering at the following website: [email or web address]. By joining [facility]’s FollowMyHealth portal, you will also be able to view your health information using other applications (apps) compatible with our system.

## 2022-01-07 NOTE — OCCUPATIONAL THERAPY INITIAL EVALUATION ADULT - GENERAL OBSERVATIONS, REHAB EVAL
Pt received semi-supine in bed, +tele, +cranial staples C/D/I, +heplock, in NAD and agreeable to OT. Cleared by ALEXIS Lanier.

## 2022-01-07 NOTE — PROGRESS NOTE ADULT - THIS PATIENT HAS THE FOLLOWING CONDITION(S)/DIAGNOSES ON THIS ADMISSION:
None
Cerebral Edema/Brain Compression / Herniation
Cerebral Edema/Brain Compression / Herniation
None
None

## 2022-01-07 NOTE — DISCHARGE NOTE PROVIDER - NSDCFUADDAPPT_GEN_ALL_CORE_FT
Please follow up with Dr. Headley on 1/19/22 at 10AM    Please follow up with your primary care doctor

## 2022-01-07 NOTE — PHYSICAL THERAPY INITIAL EVALUATION ADULT - ADDITIONAL COMMENTS
Patient is a community ambulator who lives with her boyfriend in a 4th floor walk up. Patient is independent with all ADLs prior and denies use of any assistive devices during ambulation.

## 2022-01-07 NOTE — PHYSICAL THERAPY INITIAL EVALUATION ADULT - PERTINENT HX OF CURRENT PROBLEM, REHAB EVAL
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 Patient now s/p L clipping crani of supra opthalmic aneurysm with intra-op angio 1/5/22

## 2022-01-07 NOTE — OCCUPATIONAL THERAPY INITIAL EVALUATION ADULT - MODIFIED CLINICAL TEST OF SENSORY INTEGRATION IN BALANCE TEST
Pt able to ambulate ~100' independently without AD, pt able to negotiate 1 flight of stairs up/down without use of hand rail with supervision. No LOB noted throughout, with good safety awareness.

## 2022-01-07 NOTE — OCCUPATIONAL THERAPY INITIAL EVALUATION ADULT - DIAGNOSIS, OT EVAL
Pt is s/p L clipping crani of supra opthalmic aneurysm (1/5) presents at functional baseline, no new neurological deficits noted, BUE/BLE strength and ROM WFL, with pt able to complete all ADL and mobility tasks independently. No further acute OT needs. Pt will be d/c from OT at this time.

## 2022-01-07 NOTE — DISCHARGE NOTE NURSING/CASE MANAGEMENT/SOCIAL WORK - NSDCPEFALRISK_GEN_ALL_CORE
For information on Fall & Injury Prevention, visit: [email or web address] OR  [email or web address] OR  https://www.cdc.gov/steadi/patient.html

## 2022-01-07 NOTE — DISCHARGE NOTE PROVIDER - NSDCCPCAREPLAN_GEN_ALL_CORE_FT
PRINCIPAL DISCHARGE DIAGNOSIS  Diagnosis: Brain aneurysm  Assessment and Plan of Treatment: Left paraopthalmic aneurysm      SECONDARY DISCHARGE DIAGNOSES  Diagnosis: Asthma  Assessment and Plan of Treatment:     Diagnosis: Hepatitis B  Assessment and Plan of Treatment:

## 2022-01-07 NOTE — DISCHARGE NOTE PROVIDER - CARE PROVIDER_API CALL
Shar Headley)  Neurosurgery  [address], [place] [facility]  Phone: [phone]  Fax: [phone]  Follow Up Time:

## 2022-01-07 NOTE — PHYSICAL THERAPY INITIAL EVALUATION ADULT - TRANSFER SAFETY CONCERNS NOTED: SIT/STAND, REHAB EVAL
Demo good eccentric control during descend. Performed sit<>stand transfer x 3 trials as patient with void attempt during PT session.

## 2022-01-07 NOTE — PROGRESS NOTE ADULT - SUBJECTIVE AND OBJECTIVE BOX
HPI:  This information was taken from the chart : "   31 year old woman with no pertinent medical history referred by Dr. Jayden Palmer for discussion of craniotomy for clipping of cerebral aneurysm.  Ms. Reynaga was found to have an incidental intracranial aneurysm in . MRA brain was done due to family history of aneurysm rupture (her maternal grandmother  of ruptured cerebral aneurysm). She was complaining of lower extremity tingling sensation.   MRA and CTA brain from May 2017 demonstrated infundibular dilatations at the origins of the RIGHT posterior communicating artery and RIGHT anterior choroidal. 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 She preferred follow up with repeat MRA but no follow up was done.   She had follow up MRA of the brain done on 10/21/2020 which suggested enlargement of the aneurysm. 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     The case was presented at [facility] Multidisciplinary Cerebrovascular Conference on with recommendations to treat the aneurysm based on age and and family history of SAH from ruptured aneurysm. Treatment options included flow diverting stent or clipping via craniotomy, after discussion at cerebrovascular conference, it was decided that because of her young age and family history, it is appropriate to treat the left supra-ophthalmic aneurysm with flow diversion stenting vs clip ligation.   She returns today for craniotomy for clipping of aneurysm , after a follow-up meeting in which she was informed about treatment modalities and chose to undergo clipping.    She has no history of smoking and no history of HTN.   "       (2022 07:56)      Hospital course:  : POD0 L clipping crani of supra opthalmic aneurysm with intra-op angio : POD1 L crani clipping of supraophthalmic aneurysm w/intraop angio.  thomas d/c. stepped down.   : POD2, PATRIA o/n, neuro stable, pending PT/OT    Vital Signs Last 24 Hrs  T(C): 37.3 (2022 21:33), Max: 37.6 (2022 02:01)  T(F): 99.1 (2022 21:33), Max: 99.7 (2022 02:01)  HR: 104 (2022 20:21) (67 - 104)  BP: 106/62 (2022 20:21) (94/65 - 112/65)  BP(mean): 79 (2022 20:21) (75 - 84)  RR: 18 (2022 20:21) (15 - 20)  SpO2: 98% (2022 20:21) (98% - 100%)    I&O's Summary    2022 07:01  -  2022 07:00  --------------------------------------------------------  IN: 1090 mL / OUT: 1790 mL / NET: -700 mL    2022 07:01  -  2022 00:54  --------------------------------------------------------  IN: 200 mL / OUT: 1305 mL / NET: -1105 mL        PHYSICAL EXAM:  General: patient seen laying supine in bed in NAD  Neuro: AAOx3, FC, OE spontaneously, speech clear and fluent, CNII-XI grossly intact, face symmetric, no pronator drift, strength 5/5 b/l UE and LE, sensation intact to light touch throughout  HEENT: PERRL, EOMI  Neck: supple  Cardiac: RRR, S1S2  Pulmonary: chest rise symmetric  Abdomen: soft, nontender, nondistended  Ext: perfusing well, DP pulses 2+ b/l  Skin: warm, dry  Wound: headwrap in place c/d/i, SHARON x 1, anterior cervical incision c/d/i, R groin puncture c/d/i        TUBES/LINES:  [] Thomas  [] Lumbar Drain  [x] Wound Drains SHARON x1  [] Others      DIET:  [] NPO  [x] Mechanical  [] Tube feeds    LABS:                        11.0   10.66 )-----------( 154      ( 2022 04:33 )             33.2     01-06    141  |  109<H>  |  6<L>  ----------------------------<  139<H>  3.9   |  23  |  0.47<L>    Ca    8.4      2022 04:33  Phos  4.5     -06  Mg     2.0     -    TPro  6.4  /  Alb  3.9  /  TBili  0.3  /  DBili  x   /  AST  23  /  ALT  18  /  AlkPhos  51  -            CAPILLARY BLOOD GLUCOSE      POCT Blood Glucose.: 138 mg/dL (2022 11:36)      Drug Levels: [] N/A    CSF Analysis: [] N/A      Allergies    Bactrim (Rash)  Cats (Unknown)    Intolerances      MEDICATIONS:  Antibiotics:    Neuro:  acetaminophen     Tablet .. 650 milliGRAM(s) Oral every 6 hours PRN  levETIRAcetam 500 milliGRAM(s) Oral every 12 hours  ondansetron Injectable 4 milliGRAM(s) IV Push every 6 hours PRN    Anticoagulation:    OTHER:  chlorhexidine 2% Cloths 1 Application(s) Topical <User Schedule>  dexAMETHasone  Injectable   IV Push   dexAMETHasone  Injectable 2 milliGRAM(s) IV Push every 6 hours  dexAMETHasone  Injectable 1 milliGRAM(s) IV Push every 6 hours  glucagon  Injectable 1 milliGRAM(s) IntraMuscular once  influenza   Vaccine 0.5 milliLiter(s) IntraMuscular once  pantoprazole    Tablet 40 milliGRAM(s) Oral before breakfast  senna 2 Tablet(s) Oral at bedtime    IVF:    CULTURES:    RADIOLOGY & ADDITIONAL TESTS:    I67.1    Handoff    MEWS Score    Hepatitis B    Brain aneurysm    Brain aneurysm    Brain aneurysm    Angiogram, carotid and cerebral, bilateral    Angiogram, carotid and cerebral, bilateral    Brain aneurysm surgery    History of coronary angiogram    [email or web address]      Assessment: 30y/o F w/ PMH of asthma, hep B, family history of aneurysms presents after workup and diagnostic cerebral angiogram on 2020 that demonstrated a 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 Patient now s/p L clipping crani of supra opthalmic aneurysm with intra-op angio 22      Plan:  NEURO:  -Neuro/vital/groin/vascular checks q4h  -Pain control PRN tylenol  -Decadron taper - to off  -Monitor SHARON output  -Keppra 500 BID for seizure prophylaxis    CARDIO:  -SBP goal < 140    PULM:  -Satting well on RA    GI:  -regular diet  -Bowel regimen   -PPI while on decadron    RENAL:  -Na goal 135-145    ENDO:  -ISS while on Decadron  -A1C 4.7    ID:  -Afebrile    HEME:  -SCDs for DVT ppx  -Trend H/H     DISPO: SDU status, full code, pending PT/OT  Case discussed with Dr. Headley, family updated with plan        Assessment:  Present when checked    []  GCS  E   V  M     Heart Failure: []Acute, [] acute on chronic , []chronic  Heart Failure:  [] Diastolic (HFpEF), [] Systolic (HFrEF), []Combined (HFpEF and HFrEF), [] RHF, [] Pulm HTN, [] Other    [] ELIZABETH, [] ATN, [] AIN, [] other  [] CKD1, [] CKD2, [] CKD 3, [] CKD 4, [] CKD 5, []ESRD    Encephalopathy: [] Metabolic, [] Hepatic, [] toxic, [] Neurological, [] Other    Abnormal Nurtitional Status: [] malnurtition (see nutrition note), [ ]underweight: BMI < 19, [] morbid obesity: BMI >40, [] Cachexia    [] Sepsis  [] hypovolemic shock,[] cardiogenic shock, [] hemorrhagic shock, [] neuogenic shock  [] Acute Respiratory Failure  []Cerebral edema, [] Brain compression/ herniation,   [] Functional quadriplegia  [] Acute blood loss anemia

## 2022-01-07 NOTE — PHYSICAL THERAPY INITIAL EVALUATION ADULT - MODALITIES TREATMENT COMMENTS
(L) hand  5/5, (R) hand  5/5. CN Testing: B/L Frontalis intact (slightly limited L sided Frontalis ROM 2/2 L sided cranial incision); B/L buccinator intact; smile symmetrical; tongue protrusion at midline; B/L eyes open/close intact; Shoulder elevation: intact bilaterally; Vision H-Test: bilateral tracking and smooth pursuit intact (Nystagmus observed with L Lateral tracking); Convergence/Divergence: intact; Vision Quadrant Test: intact bilaterally

## 2022-01-10 ENCOUNTER — NON-APPOINTMENT (OUTPATIENT)
Age: 32
End: 2022-01-10

## 2022-01-11 DIAGNOSIS — I67.1 CEREBRAL ANEURYSM, NONRUPTURED: ICD-10-CM

## 2022-01-11 DIAGNOSIS — J45.909 UNSPECIFIED ASTHMA, UNCOMPLICATED: ICD-10-CM

## 2022-01-11 DIAGNOSIS — I65.21 OCCLUSION AND STENOSIS OF RIGHT CAROTID ARTERY: ICD-10-CM

## 2022-01-11 DIAGNOSIS — B18.1 CHRONIC VIRAL HEPATITIS B WITHOUT DELTA-AGENT: ICD-10-CM

## 2022-01-11 DIAGNOSIS — G52.8 DISORDERS OF OTHER SPECIFIED CRANIAL NERVES: ICD-10-CM

## 2022-01-11 DIAGNOSIS — Z88.1 ALLERGY STATUS TO OTHER ANTIBIOTIC AGENTS STATUS: ICD-10-CM

## 2022-01-11 DIAGNOSIS — Z83.2 FAMILY HISTORY OF DISEASES OF THE BLOOD AND BLOOD-FORMING ORGANS AND CERTAIN DISORDERS INVOLVING THE IMMUNE MECHANISM: ICD-10-CM

## 2022-01-12 ENCOUNTER — NON-APPOINTMENT (OUTPATIENT)
Age: 32
End: 2022-01-12

## 2022-01-19 ENCOUNTER — APPOINTMENT (OUTPATIENT)
Dept: NEUROSURGERY | Facility: CLINIC | Age: 32
End: 2022-01-19
Payer: MEDICAID

## 2022-01-19 VITALS
WEIGHT: 100 LBS | OXYGEN SATURATION: 98 % | HEART RATE: 76 BPM | HEIGHT: 61.5 IN | BODY MASS INDEX: 18.64 KG/M2 | DIASTOLIC BLOOD PRESSURE: 66 MMHG | TEMPERATURE: 98 F | SYSTOLIC BLOOD PRESSURE: 98 MMHG | RESPIRATION RATE: 20 BRPM

## 2022-01-19 DIAGNOSIS — Z48.02 ENCOUNTER FOR REMOVAL OF SUTURES: ICD-10-CM

## 2022-01-19 PROCEDURE — 99024 POSTOP FOLLOW-UP VISIT: CPT

## 2022-01-19 RX ORDER — LEVETIRACETAM 500 MG/1
500 TABLET, FILM COATED ORAL
Refills: 0 | Status: DISCONTINUED | COMMUNITY
End: 2022-01-19

## 2022-01-19 NOTE — PHYSICAL EXAM
[General Appearance - Alert] : alert [General Appearance - In No Acute Distress] : in no acute distress [Dry] : dry [Healing Well] : healing well [Staple Intact] : closed with intact staples [No Drainage] : without drainage [Normal Skin] : normal [Normal Skin Turgor] : skin turgor was normal [Oriented To Time, Place, And Person] : oriented to person, place, and time [Impaired Insight] : insight and judgment were intact [Motor Tone] : muscle tone was normal in all four extremities [Abnormal Walk] : normal gait [Neck Appearance] : the appearance of the neck was normal [] : no respiratory distress [Respiration, Rhythm And Depth] : normal respiratory rhythm and effort [Heart Rate And Rhythm] : heart rate was normal and rhythm regular [Bowel Sounds] : normal bowel sounds [Abdomen Soft] : soft [FreeTextEntry1] : left neck incision with dermabond

## 2022-01-19 NOTE — ASSESSMENT
[FreeTextEntry1] : Paitent is s/p left frontal-temporal craniotomy, clipping of opthalmic artery aneurysm, decompression of optic nerve, and neck dissection with temporary ligation of cervical internal carotid artery, cranioplasty repair of skull defect, pedicle neurovascular flap repair of frontal sinus. Staples removed with ease.\par \par Shower daily. Wash hair with mild shampoo, i.e Augustine and Augustine. Pat incision line dry with dry separate towel.\par Report any S/S infection including wound drainage, redness, warmth, fever, chills,weakness.\par No tub bath/pool for 8 weeks.\par Keep incision covered and protected from the sun for 3-6 months following surgery.\par Follow up with Dr. Headley in one month for postop check.\par Taper Keppra to 250 mg BID until next follow up with Dr. Headley in a month.\par CTA head w/wo contrast 3 months (April 2022) post clipping.\par

## 2022-01-19 NOTE — REASON FOR VISIT
[de-identified] : left frontal-temporal craniotomy, clipping of opthalmic artery aneurysm, decompression of optic nerve, and neck dissection with temporary ligation of cervical internal carotid artery, cranioplasty repair of skull defect, pedicle neurovascular flap repair of frontal sinus [de-identified] : 1/5/2022 [de-identified] : She was discharged on 1/7/2022 w/o hospital stay complications. \par She was discharged on Decadron 1 mg taper, Keppra 500 mg every 12 hrs x 1 month.\par She remains on Keppra 500 mg BID and reports sleepiness. Denies seizure-like activities.\par \par Denies any signs of postop wound infection which could include but not limited to redness/swelling/purulent drainage/pain.\par She was covid-19 positive on 1/1022 without coughing but no fever. Now asymptomatic. Also previously complained of back pain that has now subsided.\par Denies SOB/CP/unilateral leg edema, myalgia, loss of taste/smell.\par She is slowly introducing preop activities.\par \par \par

## 2022-01-19 NOTE — HISTORY OF PRESENT ILLNESS
[FreeTextEntry1] : 30 year old woman with no pertinent medical history referred by Dr. Jayden Palmer for discussion of craniotomy for clipping of cerebral aneurysm.\par \par Ms. Reynaga was found to have an incidental intracranial aneurysm in . MRA brain was done due to family history of aneurysm rupture (her maternal grandmother  of ruptured cerebral aneurysm). She was complaining of lower extremity tingling sensation. 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 She preferred follow up with repeat MRA but no follow up was done. \par \par She had follow up MRA of the brain done on 10/21/2020 which suggested enlargement of the aneurysm. 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 \par \par The case was presented at [facility] Multidisciplinary Cerebrovascular Conference on with recommendations to treat the aneurysm based on age and and family history of SAH from ruptured aneurysm. Treatment options include flow diverting stent or clipping via craniotomy. It was recommended fort he patient to undergo rheumatology and vascular neurology workup prior to treatment to rule out underlying vascular disease that may cause mild stenosis of the RIGHT ICA. She has seen rheumatologist, Dr. Reji Luna, and he is suspicious that she has a fibromuscular dyplasia disorder. She is seeing Dr. Sosa for further evaluation of \par \par After discussion at cerebrovascular conference, it was decided that because of her young age and family history, it is appropriate to treat the left supra-ophthalmic aneurysm with flow diversion stenting vs clip ligation. Patient favored clip ligation.\par \par On 2022 she underwent left frontal-temporal craniotomy, clipping of opthalmic artery aneurysm, decompression of optic nerve, and neck dissection with temporary ligation of cervical internal carotid artery, cranioplasty repair of skull defect, pedicle neurovascular flap repair of frontal sinus by Dr. Shar Headley. Intra-op angiography showed no residual aneurysm and patency of all intracranial branch vessels from the internal carotid artery.\par \par

## 2022-02-25 ENCOUNTER — APPOINTMENT (OUTPATIENT)
Dept: NEUROSURGERY | Facility: CLINIC | Age: 32
End: 2022-02-25
Payer: MEDICAID

## 2022-03-11 ENCOUNTER — APPOINTMENT (OUTPATIENT)
Dept: NEUROSURGERY | Facility: CLINIC | Age: 32
End: 2022-03-11
Payer: MEDICAID

## 2022-03-18 ENCOUNTER — NON-APPOINTMENT (OUTPATIENT)
Age: 32
End: 2022-03-18

## 2022-03-18 ENCOUNTER — APPOINTMENT (OUTPATIENT)
Dept: NEUROSURGERY | Facility: CLINIC | Age: 32
End: 2022-03-18
Payer: MEDICAID

## 2022-03-18 VITALS
BODY MASS INDEX: 19.2 KG/M2 | WEIGHT: 103 LBS | HEART RATE: 78 BPM | OXYGEN SATURATION: 98 % | TEMPERATURE: 97.7 F | DIASTOLIC BLOOD PRESSURE: 70 MMHG | SYSTOLIC BLOOD PRESSURE: 106 MMHG | HEIGHT: 61.5 IN

## 2022-03-18 DIAGNOSIS — Z82.49 FAMILY HISTORY OF ISCHEMIC HEART DISEASE AND OTHER DISEASES OF THE CIRCULATORY SYSTEM: ICD-10-CM

## 2022-03-18 DIAGNOSIS — Z09 ENCOUNTER FOR FOLLOW-UP EXAMINATION AFTER COMPLETED TREATMENT FOR CONDITIONS OTHER THAN MALIGNANT NEOPLASM: ICD-10-CM

## 2022-03-18 PROCEDURE — 99024 POSTOP FOLLOW-UP VISIT: CPT

## 2022-03-19 PROBLEM — Z09 POSTOP CHECK: Status: ACTIVE | Noted: 2022-01-19

## 2022-03-19 PROBLEM — Z82.49 FAMILY HISTORY OF CEREBRAL ANEURYSM: Status: ACTIVE | Noted: 2022-03-19

## 2022-03-20 NOTE — REASON FOR VISIT
[de-identified] : left frontal-temporal craniotomy, clipping of opthalmic artery aneurysm, decompression of optic nerve, and neck dissection with temporary ligation of cervical internal carotid artery, cranioplasty repair of skull defect, pedicle neurovascular flap repair of frontal sinus. [de-identified] : 1/5/2022 [de-identified] : \par \par Denies any signs of postop wound infection which could include but not limited to redness/swelling/purulent drainage.\par Denies SOB/CP/unilateral leg edema/chills/fever/cough, myalgia, loss of taste/smell.\par She is slowly introducing preop activities.\par She denies headaches, numbness.tingling, focal weakness.\par She remains on Keppra 250 mg BID and is reporting mild drowsiness with current regimen. Patient otherwise is doing well.\par \par \par

## 2022-03-20 NOTE — HISTORY OF PRESENT ILLNESS
[FreeTextEntry1] : \par 30 year old woman with no pertinent medical history referred by Dr. Jayden Palmer for discussion of craniotomy for clipping of cerebral aneurysm.\par \par Ms. Reynaga was found to have an incidental intracranial aneurysm in . MRA brain was done due to family history of aneurysm rupture (her maternal grandmother  of ruptured cerebral aneurysm). She was complaining of lower extremity tingling sensation. 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 She preferred follow up with repeat MRA but no follow up was done. \par \par She had follow up MRA of the brain done on 10/21/2020 which suggested enlargement of the aneurysm. 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 \par \par The case was presented at [facility] Multidisciplinary Cerebrovascular Conference on with recommendations to treat the aneurysm based on age and and family history of SAH from ruptured aneurysm. Treatment options include flow diverting stent or clipping via craniotomy. It was recommended fort he patient to undergo rheumatology and vascular neurology workup prior to treatment to rule out underlying vascular disease that may cause mild stenosis of the RIGHT ICA. She has seen rheumatologist, Dr. Reji Luna, and he is suspicious that she has a fibromuscular dyplasia disorder. She is seeing Dr. Sosa for further evaluation of \par \par After discussion at cerebrovascular conference, it was decided that because of her young age and family history, it is appropriate to treat the left supra-ophthalmic aneurysm with flow diversion stenting vs clip ligation. Patient favored clip ligation.\par \par On 2022 she underwent left frontal-temporal craniotomy, clipping of opthalmic artery aneurysm, decompression of optic nerve, and neck dissection with temporary ligation of cervical internal carotid artery, cranioplasty repair of skull defect, pedicle neurovascular flap repair of frontal sinus by Dr. Shar Headley. Intra-op angiography showed no residual aneurysm and patency of all intracranial branch vessels from the internal carotid artery.\par \par Keppra tapered down to 250 mg BID on 22 and was instructed to continue it at least until her 1 mo post-op follow up with Dr. Headley.

## 2022-03-20 NOTE — PHYSICAL EXAM
[General Appearance - Alert] : alert [General Appearance - In No Acute Distress] : in no acute distress [Oriented To Time, Place, And Person] : oriented to person, place, and time [Impaired Insight] : insight and judgment were intact [Cranial Nerves Oculomotor (III)] : extraocular motion intact [Cranial Nerves Optic (II)] : visual acuity intact bilaterally,  pupils equal round and reactive to light [Cranial Nerves Trigeminal (V)] : facial sensation intact symmetrically [Cranial Nerves Facial (VII)] : face symmetrical [Cranial Nerves Vestibulocochlear (VIII)] : hearing was intact bilaterally [Cranial Nerves Glossopharyngeal (IX)] : tongue and palate midline [Cranial Nerves Hypoglossal (XII)] : there was no tongue deviation with protrusion [Cranial Nerves Accessory (XI - Cranial And Spinal)] : head turning and shoulder shrug symmetric [Motor Tone] : muscle tone was normal in all four extremities [Motor Strength] : muscle strength was normal in all four extremities [Neck Appearance] : the appearance of the neck was normal [] : no respiratory distress [Respiration, Rhythm And Depth] : normal respiratory rhythm and effort [Abnormal Walk] : normal gait [Skin Color & Pigmentation] : normal skin color and pigmentation [FreeTextEntry1] :  LEFT front-temporal C shaped incision CDI--no drainage, redness, tenderness, dehiscence noted. Mild scabbing. LEFT frontal neck incision CDI Iw/o redness, drainage. The incision was dry, healing well, closed.

## 2022-03-26 ENCOUNTER — APPOINTMENT (OUTPATIENT)
Dept: CT IMAGING | Facility: CLINIC | Age: 32
End: 2022-03-26
Payer: MEDICAID

## 2022-03-26 ENCOUNTER — OUTPATIENT (OUTPATIENT)
Dept: OUTPATIENT SERVICES | Facility: HOSPITAL | Age: 32
LOS: 1 days | End: 2022-03-26

## 2022-03-26 ENCOUNTER — RESULT REVIEW (OUTPATIENT)
Age: 32
End: 2022-03-26

## 2022-03-26 DIAGNOSIS — Z98.890 OTHER SPECIFIED POSTPROCEDURAL STATES: Chronic | ICD-10-CM

## 2022-03-26 PROCEDURE — 70496 CT ANGIOGRAPHY HEAD: CPT | Mod: 26

## 2022-04-08 ENCOUNTER — APPOINTMENT (OUTPATIENT)
Dept: NEUROSURGERY | Facility: CLINIC | Age: 32
End: 2022-04-08
Payer: MEDICAID

## 2022-04-08 DIAGNOSIS — I65.29 OCCLUSION AND STENOSIS OF UNSPECIFIED CAROTID ARTERY: ICD-10-CM

## 2022-04-08 DIAGNOSIS — Z98.890 OTHER SPECIFIED POSTPROCEDURAL STATES: ICD-10-CM

## 2022-04-08 DIAGNOSIS — I77.3 ARTERIAL FIBROMUSCULAR DYSPLASIA: ICD-10-CM

## 2022-04-08 DIAGNOSIS — I67.1 CEREBRAL ANEURYSM, NONRUPTURED: ICD-10-CM

## 2022-04-08 PROCEDURE — 99214 OFFICE O/P EST MOD 30 MIN: CPT | Mod: 95

## 2022-04-09 PROBLEM — I77.3 FIBROMUSCULAR DYSPLASIA OF CAROTID ARTERY: Status: ACTIVE | Noted: 2022-04-09

## 2022-04-09 PROBLEM — I67.1 CEREBRAL ANEURYSM: Status: ACTIVE | Noted: 2017-05-09

## 2022-04-09 PROBLEM — I65.29 INTERNAL CAROTID ARTERY STENOSIS: Status: ACTIVE | Noted: 2020-12-04

## 2022-04-09 PROBLEM — Z98.890 S/P CRANIOTOMY: Status: ACTIVE | Noted: 2022-01-19

## 2022-04-09 RX ORDER — LEVETIRACETAM 250 MG/1
250 TABLET, FILM COATED ORAL TWICE DAILY
Qty: 60 | Refills: 1 | Status: DISCONTINUED | COMMUNITY
Start: 2022-01-19 | End: 2022-04-09

## 2022-04-10 NOTE — DATA REVIEWED
[de-identified] : [facility]\par    [facility] at [facility] Department of Radiology\par   Radiology Report\par \par \par Patient Name: NISHANT FREED   Report Date: 29-Mar-2022 10:48.00 \par Patient ID: [id number] ([id number]), [id number] (EPI)  Accession No.: [id number] \par Patient Birth Date: 1990  Report Status: F \par Referring Physician: [id number] COLIN MILLS   Reason For Study: Z00.8  \par \par \par \par \par \par \par \par \par EXAM: CT ANGIO BRAIN (W)AW IC\par \par PROCEDURE DATE: 03/26/2022\par \par \par \par \par INTERPRETATION: PROCEDURE: CTA brain with intravenous contrast.\par \par INDICATION: Left ICA ophthalmic aneurysm status post clipping.\par \par TECHNIQUE: Noncontrast CT head is first obtained with MPR provided. Multiple axial thin section were obtained through the [place] [facility] [place] following the intravenous bolus injection of 80 cc Omnipaque 350. MIP series are provided.\par \par COMPARISON: CTA 05/06/2017, MRA 12/19/2021\par \par FINDINGS:\par \par Noncontrast CT head shows no acute intracranial hemorrhage or recent infarct. There is normal ventricular size. The patient is status post left frontotemporal craniotomy and clipping of a left internal carotid artery paraclinoid level aneurysm. No fluid collection.\par \par CTA demonstrates no evidence of residual recurrent aneurysm identified, and this study may serve as baseline for future surveillance purposes.\par \par There is preserved caliber of intracranial arteries without large vessel occlusion or high-grade stenosis. No additional aneurysm identified.\par \par \par \par IMPRESSION: Status post surgical clipping of left ICA opthalmic aneurysm. No residual seen.\par \par --- End of Report ---\par \par  \par

## 2022-04-10 NOTE — ASSESSMENT
[FreeTextEntry1] : 3/26/22 CTA head reviewed by Dr. Headley with the patient that shows no aneurysm residual s/p clipping of LICA ophthalmic aneurysm.\par \par Previous diagnostic cerebral angiogram showed mild stenosis of the right ICA at the dural ring level---questionable fibromuscular dysplasia. She was referred to Dr. Nadya Sosa for the workup. Risk of dissection 2/2 FMD was explained by Dr. Headley to the patient. She was advised to follow dissection protocol of minimal neck manipulation to diminish the risk of carotid/vert dissection.\par \par Because of adamstent's young age with history of cerebral aneurysm and questionable FMD, a repeat CTA head and neck is recommended in 5 years to assess for new aneurysm and stenosis related to FMD.\par \par \par \par I, Dr. Headley, personally performed the evaluation and management (E/M) services for this established patient who presents today with (a) new problem(s)/exacerbation of (an) existing condition(s). That E/M includes conducting the examination, assessing all new/exacerbated conditions, and establishing a new plan of care. Today, my ACP, Chrissie Sutherland, was here to observe my evaluation and management services for this new problem/exacerbated condition to be followed going forward.\par \par \par \par

## 2022-04-10 NOTE — REASON FOR VISIT
[Follow-Up: _____] : a [unfilled] follow-up visit [FreeTextEntry1] : to review 3 month CTA s/p clipping of of opthalmic artery aneurysm on 1/5/22.

## 2025-04-23 ENCOUNTER — APPOINTMENT (OUTPATIENT)
Dept: NEUROSURGERY | Facility: CLINIC | Age: 35
End: 2025-04-23
Payer: COMMERCIAL

## 2025-04-23 DIAGNOSIS — Z98.890 OTHER SPECIFIED POSTPROCEDURAL STATES: ICD-10-CM

## 2025-04-23 DIAGNOSIS — I77.3 ARTERIAL FIBROMUSCULAR DYSPLASIA: ICD-10-CM

## 2025-04-23 DIAGNOSIS — Z01.818 ENCOUNTER FOR OTHER PREPROCEDURAL EXAMINATION: ICD-10-CM

## 2025-04-23 DIAGNOSIS — Z09 ENCOUNTER FOR FOLLOW-UP EXAMINATION AFTER COMPLETED TREATMENT FOR CONDITIONS OTHER THAN MALIGNANT NEOPLASM: ICD-10-CM

## 2025-04-23 DIAGNOSIS — I67.1 CEREBRAL ANEURYSM, NONRUPTURED: ICD-10-CM

## 2025-04-23 DIAGNOSIS — Z92.89 PERSONAL HISTORY OF OTHER MEDICAL TREATMENT: ICD-10-CM

## 2025-04-23 PROCEDURE — 99215 OFFICE O/P EST HI 40 MIN: CPT | Mod: 95

## (undated) DEVICE — DRAPE LIGHT HANDLE COVER (GREEN)

## (undated) DEVICE — SUT NUROLON 4-0 8-18" TF (POP-OFF)

## (undated) DEVICE — MIDAS REX LEGEND BALL FLUTED LG BORE 5.0MM X 9CM

## (undated) DEVICE — Device

## (undated) DEVICE — MINI DOPPLER PROBE

## (undated) DEVICE — DRAPE MICROSCOPE EXOSCOPE 12" X 79"

## (undated) DEVICE — DRAPE MAYO STAND 30"

## (undated) DEVICE — GLV 8.5 PROTEXIS (WHITE)

## (undated) DEVICE — RUBBERBAND STRL LTX FR 200/CA 3X1/8IN

## (undated) DEVICE — SUT ETHILON 2-0 18" PS

## (undated) DEVICE — MIDAS REX LEGEND BALL FLUTED LG BORE 4.0MM X 9CM

## (undated) DEVICE — AESCULAP SCALPFIX 10 CLIPS

## (undated) DEVICE — ARACHNOID BACKCUTTING LONG HANDLE 8"

## (undated) DEVICE — SUT VICRYL PLUS 2-0 18" CT-1 (POP-OFF)

## (undated) DEVICE — DRAPE MICROSCOPE LEICA MINI

## (undated) DEVICE — SPONGE SURGICAL STRIP 1" X 6"

## (undated) DEVICE — LONE STAR RETRACTOR RING 12MM BLUNT DISP

## (undated) DEVICE — PACK CRANIOTOMY LNX SURGICOUNT

## (undated) DEVICE — SPONGE PEANUT AUTO COUNT

## (undated) DEVICE — NDL SPINAL 18G X 3.5" (PINK)

## (undated) DEVICE — TUBING SUCTION 20FT

## (undated) DEVICE — APPLICATOR SKIN PREP CHG 3CC

## (undated) DEVICE — MIDAS REX LEGEND TAPERED SM BORE 2.3MM X 8CM

## (undated) DEVICE — WARMING BLANKET FULL UNDERBODY

## (undated) DEVICE — COTTONBALL LG

## (undated) DEVICE — CODMAN PERFORATOR 14MM (BLUE)

## (undated) DEVICE — STAPLER SKIN PROXIMATE

## (undated) DEVICE — VENODYNE/SCD SLEEVE CALF MEDIUM

## (undated) DEVICE — BIPOLAR FORCEP KOGENT IRRIGATING STRAIGHT 0.5MM X 7" DISP

## (undated) DEVICE — SPONGE SURGICAL STRIP 1/2 X 6"

## (undated) DEVICE — SPONGE SURGICAL STRIP 1/4 X 6"

## (undated) DEVICE — SUT VICRYL PLUS 2-0 18" CT-2 (POP-OFF)

## (undated) DEVICE — DRAPE INSTRUMENT POUCH 6.75" X 11"

## (undated) DEVICE — MARKING PEN W RULER

## (undated) DEVICE — ARACHNOID CIRCULAR SUPERFICIAL HANDLE 5"

## (undated) DEVICE — GLV 8 PROTEXIS (WHITE)